# Patient Record
Sex: FEMALE | Race: WHITE | NOT HISPANIC OR LATINO | Employment: FULL TIME | URBAN - METROPOLITAN AREA
[De-identification: names, ages, dates, MRNs, and addresses within clinical notes are randomized per-mention and may not be internally consistent; named-entity substitution may affect disease eponyms.]

---

## 2018-10-23 ENCOUNTER — HOSPITAL ENCOUNTER (EMERGENCY)
Facility: HOSPITAL | Age: 28
Discharge: HOME | End: 2018-10-23
Attending: EMERGENCY MEDICINE | Admitting: EMERGENCY MEDICINE

## 2018-10-23 VITALS
TEMPERATURE: 97.9 F | BODY MASS INDEX: 29.98 KG/M2 | RESPIRATION RATE: 19 BRPM | HEART RATE: 71 BPM | DIASTOLIC BLOOD PRESSURE: 76 MMHG | SYSTOLIC BLOOD PRESSURE: 121 MMHG | HEIGHT: 67 IN | OXYGEN SATURATION: 94 % | WEIGHT: 191 LBS

## 2018-10-23 DIAGNOSIS — M06.9 RHEUMATOID ARTHRITIS FLARE (HCC): Primary | ICD-10-CM

## 2018-10-23 LAB
ALBUMIN SERPL-MCNC: 4.05 G/DL (ref 3.2–4.8)
ALBUMIN/GLOB SERPL: 1.6 G/DL (ref 1.5–2.5)
ALP SERPL-CCNC: 49 U/L (ref 25–100)
ALT SERPL W P-5'-P-CCNC: 34 U/L (ref 7–40)
ANION GAP SERPL CALCULATED.3IONS-SCNC: 4 MMOL/L (ref 3–11)
AST SERPL-CCNC: 34 U/L (ref 0–33)
BASOPHILS # BLD AUTO: 0.04 10*3/MM3 (ref 0–0.2)
BASOPHILS NFR BLD AUTO: 0.5 % (ref 0–1)
BILIRUB SERPL-MCNC: 0.4 MG/DL (ref 0.3–1.2)
BUN BLD-MCNC: 10 MG/DL (ref 9–23)
BUN/CREAT SERPL: 11.2 (ref 7–25)
CALCIUM SPEC-SCNC: 8.8 MG/DL (ref 8.7–10.4)
CHLORIDE SERPL-SCNC: 109 MMOL/L (ref 99–109)
CO2 SERPL-SCNC: 25 MMOL/L (ref 20–31)
CREAT BLD-MCNC: 0.89 MG/DL (ref 0.6–1.3)
CRP SERPL-MCNC: 1.61 MG/DL (ref 0–1)
DEPRECATED RDW RBC AUTO: 44.8 FL (ref 37–54)
EOSINOPHIL # BLD AUTO: 0.08 10*3/MM3 (ref 0–0.3)
EOSINOPHIL NFR BLD AUTO: 0.9 % (ref 0–3)
ERYTHROCYTE [DISTWIDTH] IN BLOOD BY AUTOMATED COUNT: 12.9 % (ref 11.3–14.5)
ERYTHROCYTE [SEDIMENTATION RATE] IN BLOOD: 15 MM/HR (ref 0–20)
GFR SERPL CREATININE-BSD FRML MDRD: 76 ML/MIN/1.73
GLOBULIN UR ELPH-MCNC: 2.6 GM/DL
GLUCOSE BLD-MCNC: 93 MG/DL (ref 70–100)
HCT VFR BLD AUTO: 39.3 % (ref 34.5–44)
HGB BLD-MCNC: 13 G/DL (ref 11.5–15.5)
IMM GRANULOCYTES # BLD: 0.01 10*3/MM3 (ref 0–0.03)
IMM GRANULOCYTES NFR BLD: 0.1 % (ref 0–0.6)
LYMPHOCYTES # BLD AUTO: 1.19 10*3/MM3 (ref 0.6–4.8)
LYMPHOCYTES NFR BLD AUTO: 13.8 % (ref 24–44)
MCH RBC QN AUTO: 31.2 PG (ref 27–31)
MCHC RBC AUTO-ENTMCNC: 33.1 G/DL (ref 32–36)
MCV RBC AUTO: 94.2 FL (ref 80–99)
MONOCYTES # BLD AUTO: 0.68 10*3/MM3 (ref 0–1)
MONOCYTES NFR BLD AUTO: 7.9 % (ref 0–12)
NEUTROPHILS # BLD AUTO: 6.66 10*3/MM3 (ref 1.5–8.3)
NEUTROPHILS NFR BLD AUTO: 76.9 % (ref 41–71)
NRBC BLD MANUAL-RTO: 0 /100 WBC (ref 0–0)
PLATELET # BLD AUTO: 180 10*3/MM3 (ref 150–450)
PMV BLD AUTO: 11.8 FL (ref 6–12)
POTASSIUM BLD-SCNC: 3.9 MMOL/L (ref 3.5–5.5)
PROT SERPL-MCNC: 6.6 G/DL (ref 5.7–8.2)
RBC # BLD AUTO: 4.17 10*6/MM3 (ref 3.89–5.14)
SODIUM BLD-SCNC: 138 MMOL/L (ref 132–146)
WBC NRBC COR # BLD: 8.65 10*3/MM3 (ref 3.5–10.8)

## 2018-10-23 PROCEDURE — 25010000002 KETOROLAC TROMETHAMINE PER 15 MG: Performed by: EMERGENCY MEDICINE

## 2018-10-23 PROCEDURE — 25010000002 HYDROMORPHONE 1 MG/ML SOLUTION: Performed by: EMERGENCY MEDICINE

## 2018-10-23 PROCEDURE — 25010000002 METHYLPREDNISOLONE PER 40 MG: Performed by: EMERGENCY MEDICINE

## 2018-10-23 PROCEDURE — 96374 THER/PROPH/DIAG INJ IV PUSH: CPT

## 2018-10-23 PROCEDURE — 85652 RBC SED RATE AUTOMATED: CPT | Performed by: EMERGENCY MEDICINE

## 2018-10-23 PROCEDURE — 96375 TX/PRO/DX INJ NEW DRUG ADDON: CPT

## 2018-10-23 PROCEDURE — 80053 COMPREHEN METABOLIC PANEL: CPT | Performed by: EMERGENCY MEDICINE

## 2018-10-23 PROCEDURE — 99284 EMERGENCY DEPT VISIT MOD MDM: CPT

## 2018-10-23 PROCEDURE — 86140 C-REACTIVE PROTEIN: CPT | Performed by: EMERGENCY MEDICINE

## 2018-10-23 PROCEDURE — 85025 COMPLETE CBC W/AUTO DIFF WBC: CPT | Performed by: EMERGENCY MEDICINE

## 2018-10-23 RX ORDER — LEVETIRACETAM 750 MG/1
750 TABLET ORAL 2 TIMES DAILY
COMMUNITY
End: 2020-07-23

## 2018-10-23 RX ORDER — SODIUM CHLORIDE 0.9 % (FLUSH) 0.9 %
10 SYRINGE (ML) INJECTION AS NEEDED
Status: DISCONTINUED | OUTPATIENT
Start: 2018-10-23 | End: 2018-10-23 | Stop reason: HOSPADM

## 2018-10-23 RX ORDER — BUPROPION HYDROCHLORIDE 150 MG/1
150 TABLET ORAL DAILY
COMMUNITY
End: 2020-07-23

## 2018-10-23 RX ORDER — KETOROLAC TROMETHAMINE 15 MG/ML
10 INJECTION, SOLUTION INTRAMUSCULAR; INTRAVENOUS ONCE
Status: COMPLETED | OUTPATIENT
Start: 2018-10-23 | End: 2018-10-23

## 2018-10-23 RX ORDER — METHYLPREDNISOLONE SODIUM SUCCINATE 40 MG/ML
120 INJECTION, POWDER, LYOPHILIZED, FOR SOLUTION INTRAMUSCULAR; INTRAVENOUS ONCE
Status: COMPLETED | OUTPATIENT
Start: 2018-10-23 | End: 2018-10-23

## 2018-10-23 RX ORDER — OXYCODONE HYDROCHLORIDE AND ACETAMINOPHEN 5; 325 MG/1; MG/1
1 TABLET ORAL EVERY 6 HOURS PRN
Qty: 12 TABLET | Refills: 0 | OUTPATIENT
Start: 2018-10-23 | End: 2020-07-23

## 2018-10-23 RX ORDER — PREDNISONE 10 MG/1
TABLET ORAL
Qty: 60 TABLET | Refills: 0 | OUTPATIENT
Start: 2018-10-23 | End: 2022-07-31

## 2018-10-23 RX ADMIN — KETOROLAC TROMETHAMINE 10 MG: 15 INJECTION, SOLUTION INTRAMUSCULAR; INTRAVENOUS at 10:28

## 2018-10-23 RX ADMIN — HYDROMORPHONE HYDROCHLORIDE 1 MG: 1 INJECTION, SOLUTION INTRAMUSCULAR; INTRAVENOUS; SUBCUTANEOUS at 11:11

## 2018-10-23 RX ADMIN — METHYLPREDNISOLONE SODIUM SUCCINATE 120 MG: 40 INJECTION, POWDER, FOR SOLUTION INTRAMUSCULAR; INTRAVENOUS at 10:29

## 2018-10-23 NOTE — ED PROVIDER NOTES
Subjective   Nicanor Butler is a 28 y.o.female with a hx of rheumatoid arthritis who presents to the ED with complaints of arm pain. The patient complains of arthralgias throughout her entire left arm secondary to a rheumatoid arthritis flare up. She says her pain is the worst in her left shoulder and worsened by movement. She also endorses pain to a lesser extent in her right wrist and bilateral knees. She has not taken any medications for her pain. She used CBD oil yesterday. She was very stressed yesterday and states that stress often causes flare ups of her pain. She also has mild chest tightness and complains of eye pain and early onset of a headache. She denies any shortness of breath, fever, shakes, or chills. She used to take Methotrexate and Humira for her rheumatoid arthritis, but she ultimately discontinued these medications approximately 2 months ago because she believed the risks outweighed the benefits. She says that she was doing well on these medications. She has not been able to establish a relationship with a Rheumatologist since moving to [place] from [place]. She used prednisone a year ago for unspecified arthritis pain until she received a diagnosis of rheumatoid arthritis, at which time she was transitioned to the meds above. Her past medical history is significant for migraines, fainting spells, and vasovagal syncope. There are no other acute complaints at this time.             History provided by:  Patient  Pain   Location:  Left arm, right wrist, knees  Quality:  Arthritis  Severity:  Severe  Onset quality:  Gradual  Timing:  Constant  Progression:  Unchanged  Chronicity:  Recurrent  Context:  Hx of rheumatoid arthritis. Recent flare up. Very stressed yesterday.   Relieved by:  Nothing  Worsened by:  Movement.   Ineffective treatments:  CBD oil.   Associated symptoms: headaches    Associated symptoms: no fever and no shortness of breath        Review of Systems   Constitutional: Negative for  chills and fever.   Eyes: Positive for pain.   Respiratory: Positive for chest tightness (mild). Negative for shortness of breath.    Musculoskeletal: Positive for arthralgias.   Neurological: Positive for headaches.   All other systems reviewed and are negative.      Past Medical History:   Diagnosis Date   • Depression    • Seizures (CMS/HCC)        Allergies   Allergen Reactions   • Motrin [Ibuprofen] Swelling     ONLY BRAND NAME MOTRIN       History reviewed. No pertinent surgical history.    History reviewed. No pertinent family history.    Social History     Social History   • Marital status:      Social History Main Topics   • Smoking status: Never Smoker   • Alcohol use Yes   • Drug use: No   • Sexual activity: Defer     Other Topics Concern   • Not on file         Objective   Physical Exam   Constitutional: She is oriented to person, place, and time. She appears well-developed and well-nourished. She appears distressed.   HENT:   Head: Normocephalic and atraumatic.   Nose: Nose normal.   Eyes: Conjunctivae are normal. No scleral icterus.   Neck: Normal range of motion. Neck supple.   She is tender all across the back of neck and trapezii.    Cardiovascular: Normal rate, regular rhythm and normal heart sounds.    No murmur heard.  Pulmonary/Chest: Effort normal and breath sounds normal. No respiratory distress.   Abdominal: Soft. Bowel sounds are normal. There is no tenderness.   Musculoskeletal: She exhibits tenderness. She exhibits no edema.   She has no T or L spine tenderness. Her right wrist is painful with motion and without any obvious redness or swelling. She has some pain in her right hip with flexion. There is no redness or heat in her left upper extremity, but she exhibits a lot of pain with movement of all joints in her left upper extremity.   Neurological: She is alert and oriented to person, place, and time.   Skin: Skin is warm and dry.   Psychiatric: She has a normal mood and affect.  Her behavior is normal.   Nursing note and vitals reviewed.      Procedures         ED Course  ED Course as of Oct 23 1540   Tue Oct 23, 2018   0956 She states that she can take ibuprofen, just not Motrin.  Has not had other NSAIDs.  [LI]   1052 Revisited the patient to update her on the results of labs and the plan for further care.   [TJ]   1054 She is only feeling a little better but will hopefully steadily improve.  She needs a PCP to be referred to a rheumatologist.  [LI]   1057 CHAPINCITO query complete. Treatment plan to include limited course of prescribed  controlled substance. Risks including addiction, benefits, and alternatives presented to patient.     [TJ]      ED Course User Index  [LI] Salvador Villarreal MD  [TJ] Chandler Dejesus       Recent Results (from the past 24 hour(s))   Comprehensive Metabolic Panel    Collection Time: 10/23/18 10:11 AM   Result Value Ref Range    Glucose 93 70 - 100 mg/dL    BUN 10 9 - 23 mg/dL    Creatinine 0.89 0.60 - 1.30 mg/dL    Sodium 138 132 - 146 mmol/L    Potassium 3.9 3.5 - 5.5 mmol/L    Chloride 109 99 - 109 mmol/L    CO2 25.0 20.0 - 31.0 mmol/L    Calcium 8.8 8.7 - 10.4 mg/dL    Total Protein 6.6 5.7 - 8.2 g/dL    Albumin 4.05 3.20 - 4.80 g/dL    ALT (SGPT) 34 7 - 40 U/L    AST (SGOT) 34 (H) 0 - 33 U/L    Alkaline Phosphatase 49 25 - 100 U/L    Total Bilirubin 0.4 0.3 - 1.2 mg/dL    eGFR Non African Amer 76 >60 mL/min/1.73    Globulin 2.6 gm/dL    A/G Ratio 1.6 1.5 - 2.5 g/dL    BUN/Creatinine Ratio 11.2 7.0 - 25.0    Anion Gap 4.0 3.0 - 11.0 mmol/L   Sedimentation Rate    Collection Time: 10/23/18 10:11 AM   Result Value Ref Range    Sed Rate 15 0 - 20 mm/hr   C-reactive Protein    Collection Time: 10/23/18 10:11 AM   Result Value Ref Range    C-Reactive Protein 1.61 (H) 0.00 - 1.00 mg/dL   CBC Auto Differential    Collection Time: 10/23/18 10:11 AM   Result Value Ref Range    WBC 8.65 3.50 - 10.80 10*3/mm3    RBC 4.17 3.89 - 5.14 10*6/mm3    Hemoglobin 13.0 11.5 -  "15.5 g/dL    Hematocrit 39.3 34.5 - 44.0 %    MCV 94.2 80.0 - 99.0 fL    MCH 31.2 (H) 27.0 - 31.0 pg    MCHC 33.1 32.0 - 36.0 g/dL    RDW 12.9 11.3 - 14.5 %    RDW-SD 44.8 37.0 - 54.0 fl    MPV 11.8 6.0 - 12.0 fL    Platelets 180 150 - 450 10*3/mm3    Neutrophil % 76.9 (H) 41.0 - 71.0 %    Lymphocyte % 13.8 (L) 24.0 - 44.0 %    Monocyte % 7.9 0.0 - 12.0 %    Eosinophil % 0.9 0.0 - 3.0 %    Basophil % 0.5 0.0 - 1.0 %    Immature Grans % 0.1 0.0 - 0.6 %    Neutrophils, Absolute 6.66 1.50 - 8.30 10*3/mm3    Lymphocytes, Absolute 1.19 0.60 - 4.80 10*3/mm3    Monocytes, Absolute 0.68 0.00 - 1.00 10*3/mm3    Eosinophils, Absolute 0.08 0.00 - 0.30 10*3/mm3    Basophils, Absolute 0.04 0.00 - 0.20 10*3/mm3    Immature Grans, Absolute 0.01 0.00 - 0.03 10*3/mm3    nRBC 0.0 0.0 - 0.0 /100 WBC     Note: In addition to lab results from this visit, the labs listed above may include labs taken at another facility or during a different encounter within the last 24 hours. Please correlate lab times with ED admission and discharge times for further clarification of the services performed during this visit.    No orders to display     Vitals:    10/23/18 0916 10/23/18 1024 10/23/18 1125 10/23/18 1130   BP: 141/84 134/81 121/76    BP Location: Right arm      Patient Position: Sitting      Pulse: 81 72  71   Resp: 18   19   Temp: 97.9 °F (36.6 °C)      TempSrc: Oral      SpO2: 96% 99% 94%    Weight: 86.6 kg (191 lb)      Height: 170.2 cm (67\")        Medications   ketorolac (TORADOL) injection 10 mg (10 mg Intravenous Given 10/23/18 1028)   methylPREDNISolone sodium succinate (SOLU-Medrol) injection 120 mg (120 mg Intravenous Given 10/23/18 1029)   HYDROmorphone (DILAUDID) injection 1 mg (1 mg Intravenous Given 10/23/18 1111)     ECG/EMG Results (last 24 hours)     ** No results found for the last 24 hours. **                      [facility]    Final diagnoses:   Rheumatoid arthritis flare (CMS/[facility])       Documentation assistance provided by " chapo Dejesus.  Information recorded by the scribe was done at my direction and has been verified and validated by me.     Chandler Dejesus  10/23/18 1010       Chandler Dejesus  10/23/18 1100       Salvador Villarreal MD  10/23/18 9091

## 2018-10-23 NOTE — DISCHARGE INSTRUCTIONS
Start the prednisone tomorrow morning.     Follow up with one of the [facility] Primary Care Providers below as soon as possible to setup primary care. If you need assistance coordinating a primary care appointment with a [facility] Primary Care Provider, please contact the Primary Care Coordinators at [phone] for appointment scheduling.    [facility], Primary Care   [address] , [address]   [place], [place] [address] [phone]    [facility] Internal Medicine & Endocrinology  [address], [address]  [place] [address] [phone]    [facility] Family Medicine  [address], [address]   [place] [address] [phone]    [facility] Primary Care  [address], [address]  [place], [place] [id number] [phone]    [facility], Primary Care,   [address], [address]   [place] [address] [phone]    [facility] Primary Care  [address]  [place], [place] [id number] [phone]    [facility], Primary Care  [address], [address]  [place] [address] [phone]    [facility] Internal Medicine & Pediatrics  [address], [address]   [place] [address] [phone]    [facility], Primary Care  [address] [place] [address] [phone]      [facility] Primary Care  [address], [address]   [place] [address] [phone]    [facility] Family Medicine  [address] [place] [address] [place] [address] [phone]      Immediately return to the ED for worsening or new concerning symptoms.

## 2018-11-27 ENCOUNTER — HOSPITAL ENCOUNTER (EMERGENCY)
Facility: HOSPITAL | Age: 28
Discharge: HOME | End: 2018-11-27
Attending: EMERGENCY MEDICINE | Admitting: EMERGENCY MEDICINE

## 2018-11-27 VITALS
SYSTOLIC BLOOD PRESSURE: 161 MMHG | TEMPERATURE: 98.1 F | RESPIRATION RATE: 16 BRPM | DIASTOLIC BLOOD PRESSURE: 70 MMHG | HEART RATE: 87 BPM | OXYGEN SATURATION: 99 % | WEIGHT: 195 LBS | BODY MASS INDEX: 30.61 KG/M2 | HEIGHT: 67 IN

## 2018-11-27 DIAGNOSIS — N39.0 ACUTE UTI: Primary | ICD-10-CM

## 2018-11-27 LAB
ALBUMIN SERPL-MCNC: 4.46 G/DL (ref 3.2–4.8)
ALBUMIN/GLOB SERPL: 1.6 G/DL (ref 1.5–2.5)
ALP SERPL-CCNC: 57 U/L (ref 25–100)
ALT SERPL W P-5'-P-CCNC: 28 U/L (ref 7–40)
ANION GAP SERPL CALCULATED.3IONS-SCNC: 7 MMOL/L (ref 3–11)
AST SERPL-CCNC: 28 U/L (ref 0–33)
B-HCG UR QL: NEGATIVE
BACTERIA UR QL AUTO: ABNORMAL /HPF
BASOPHILS # BLD AUTO: 0.03 10*3/MM3 (ref 0–0.2)
BASOPHILS NFR BLD AUTO: 0.3 % (ref 0–1)
BILIRUB SERPL-MCNC: 0.6 MG/DL (ref 0.3–1.2)
BILIRUB UR QL STRIP: NEGATIVE
BUN BLD-MCNC: 9 MG/DL (ref 9–23)
BUN/CREAT SERPL: 10.5 (ref 7–25)
CALCIUM SPEC-SCNC: 9.2 MG/DL (ref 8.7–10.4)
CHLORIDE SERPL-SCNC: 102 MMOL/L (ref 99–109)
CLARITY UR: ABNORMAL
CO2 SERPL-SCNC: 29 MMOL/L (ref 20–31)
COLOR UR: YELLOW
CREAT BLD-MCNC: 0.86 MG/DL (ref 0.6–1.3)
DEPRECATED RDW RBC AUTO: 46.1 FL (ref 37–54)
EOSINOPHIL # BLD AUTO: 0.03 10*3/MM3 (ref 0–0.3)
EOSINOPHIL NFR BLD AUTO: 0.3 % (ref 0–3)
ERYTHROCYTE [DISTWIDTH] IN BLOOD BY AUTOMATED COUNT: 13.1 % (ref 11.3–14.5)
GFR SERPL CREATININE-BSD FRML MDRD: 79 ML/MIN/1.73
GLOBULIN UR ELPH-MCNC: 2.7 GM/DL
GLUCOSE BLD-MCNC: 80 MG/DL (ref 70–100)
GLUCOSE UR STRIP-MCNC: NEGATIVE MG/DL
HCT VFR BLD AUTO: 43 % (ref 34.5–44)
HGB BLD-MCNC: 14 G/DL (ref 11.5–15.5)
HGB UR QL STRIP.AUTO: ABNORMAL
HOLD SPECIMEN: NORMAL
HOLD SPECIMEN: NORMAL
HYALINE CASTS UR QL AUTO: ABNORMAL /LPF
IMM GRANULOCYTES # BLD: 0.02 10*3/MM3 (ref 0–0.03)
IMM GRANULOCYTES NFR BLD: 0.2 % (ref 0–0.6)
INTERNAL NEGATIVE CONTROL: NEGATIVE
INTERNAL POSITIVE CONTROL: POSITIVE
KETONES UR QL STRIP: NEGATIVE
LEUKOCYTE ESTERASE UR QL STRIP.AUTO: ABNORMAL
LIPASE SERPL-CCNC: 27 U/L (ref 6–51)
LYMPHOCYTES # BLD AUTO: 2.09 10*3/MM3 (ref 0.6–4.8)
LYMPHOCYTES NFR BLD AUTO: 17.5 % (ref 24–44)
Lab: NORMAL
MCH RBC QN AUTO: 31.2 PG (ref 27–31)
MCHC RBC AUTO-ENTMCNC: 32.6 G/DL (ref 32–36)
MCV RBC AUTO: 95.8 FL (ref 80–99)
MONOCYTES # BLD AUTO: 0.7 10*3/MM3 (ref 0–1)
MONOCYTES NFR BLD AUTO: 5.9 % (ref 0–12)
NEUTROPHILS # BLD AUTO: 9.07 10*3/MM3 (ref 1.5–8.3)
NEUTROPHILS NFR BLD AUTO: 75.8 % (ref 41–71)
NITRITE UR QL STRIP: NEGATIVE
PH UR STRIP.AUTO: 5.5 [PH] (ref 5–8)
PLATELET # BLD AUTO: 209 10*3/MM3 (ref 150–450)
PMV BLD AUTO: 10.9 FL (ref 6–12)
POTASSIUM BLD-SCNC: 3.7 MMOL/L (ref 3.5–5.5)
PROT SERPL-MCNC: 7.2 G/DL (ref 5.7–8.2)
PROT UR QL STRIP: NEGATIVE
RBC # BLD AUTO: 4.49 10*6/MM3 (ref 3.89–5.14)
RBC # UR: ABNORMAL /HPF
REF LAB TEST METHOD: ABNORMAL
SODIUM BLD-SCNC: 138 MMOL/L (ref 132–146)
SP GR UR STRIP: 1.01 (ref 1–1.03)
SQUAMOUS #/AREA URNS HPF: ABNORMAL /HPF
UROBILINOGEN UR QL STRIP: ABNORMAL
WBC NRBC COR # BLD: 11.94 10*3/MM3 (ref 3.5–10.8)
WBC UR QL AUTO: ABNORMAL /HPF
WHOLE BLOOD HOLD SPECIMEN: NORMAL
WHOLE BLOOD HOLD SPECIMEN: NORMAL

## 2018-11-27 PROCEDURE — 99283 EMERGENCY DEPT VISIT LOW MDM: CPT

## 2018-11-27 PROCEDURE — 96372 THER/PROPH/DIAG INJ SC/IM: CPT

## 2018-11-27 PROCEDURE — 25010000002 CEFTRIAXONE PER 250 MG: Performed by: PHYSICIAN ASSISTANT

## 2018-11-27 PROCEDURE — 85025 COMPLETE CBC W/AUTO DIFF WBC: CPT | Performed by: EMERGENCY MEDICINE

## 2018-11-27 PROCEDURE — 80053 COMPREHEN METABOLIC PANEL: CPT | Performed by: EMERGENCY MEDICINE

## 2018-11-27 PROCEDURE — 81025 URINE PREGNANCY TEST: CPT | Performed by: EMERGENCY MEDICINE

## 2018-11-27 PROCEDURE — 81001 URINALYSIS AUTO W/SCOPE: CPT | Performed by: EMERGENCY MEDICINE

## 2018-11-27 PROCEDURE — 83690 ASSAY OF LIPASE: CPT | Performed by: EMERGENCY MEDICINE

## 2018-11-27 RX ORDER — PHENAZOPYRIDINE HYDROCHLORIDE 200 MG/1
200 TABLET, FILM COATED ORAL 3 TIMES DAILY PRN
Qty: 6 TABLET | Refills: 0 | OUTPATIENT
Start: 2018-11-27 | End: 2020-07-23

## 2018-11-27 RX ORDER — PHENAZOPYRIDINE HYDROCHLORIDE 100 MG/1
200 TABLET, FILM COATED ORAL ONCE
Status: COMPLETED | OUTPATIENT
Start: 2018-11-27 | End: 2018-11-27

## 2018-11-27 RX ORDER — SODIUM CHLORIDE 0.9 % (FLUSH) 0.9 %
10 SYRINGE (ML) INJECTION AS NEEDED
Status: DISCONTINUED | OUTPATIENT
Start: 2018-11-27 | End: 2018-11-27 | Stop reason: HOSPADM

## 2018-11-27 RX ORDER — CEFDINIR 300 MG/1
300 CAPSULE ORAL 2 TIMES DAILY
Qty: 20 CAPSULE | Refills: 0 | OUTPATIENT
Start: 2018-11-27 | End: 2019-08-17

## 2018-11-27 RX ADMIN — PHENAZOPYRIDINE HYDROCHLORIDE 200 MG: 100 TABLET ORAL at 20:59

## 2018-11-27 RX ADMIN — LIDOCAINE HYDROCHLORIDE 1 G: 10 INJECTION, SOLUTION EPIDURAL; INFILTRATION; INTRACAUDAL; PERINEURAL at 21:00

## 2019-01-04 ENCOUNTER — HOSPITAL ENCOUNTER (EMERGENCY)
Facility: HOSPITAL | Age: 29
Discharge: HOME | End: 2019-01-04
Attending: EMERGENCY MEDICINE | Admitting: EMERGENCY MEDICINE

## 2019-01-04 ENCOUNTER — APPOINTMENT (OUTPATIENT)
Dept: GENERAL RADIOLOGY | Facility: HOSPITAL | Age: 29
End: 2019-01-04

## 2019-01-04 VITALS
BODY MASS INDEX: 34.6 KG/M2 | WEIGHT: 188 LBS | HEIGHT: 62 IN | HEART RATE: 91 BPM | SYSTOLIC BLOOD PRESSURE: 135 MMHG | RESPIRATION RATE: 18 BRPM | TEMPERATURE: 98.5 F | OXYGEN SATURATION: 99 % | DIASTOLIC BLOOD PRESSURE: 79 MMHG

## 2019-01-04 DIAGNOSIS — M19.90 ACUTE ARTHRITIS: Primary | ICD-10-CM

## 2019-01-04 LAB — S PYO AG THROAT QL: NEGATIVE

## 2019-01-04 PROCEDURE — 63710000001 PREDNISONE PER 1 MG: Performed by: EMERGENCY MEDICINE

## 2019-01-04 PROCEDURE — 71045 X-RAY EXAM CHEST 1 VIEW: CPT

## 2019-01-04 PROCEDURE — 99284 EMERGENCY DEPT VISIT MOD MDM: CPT

## 2019-01-04 PROCEDURE — 96374 THER/PROPH/DIAG INJ IV PUSH: CPT

## 2019-01-04 PROCEDURE — 87880 STREP A ASSAY W/OPTIC: CPT | Performed by: EMERGENCY MEDICINE

## 2019-01-04 PROCEDURE — 25010000002 FENTANYL CITRATE (PF) 100 MCG/2ML SOLUTION: Performed by: EMERGENCY MEDICINE

## 2019-01-04 PROCEDURE — 87081 CULTURE SCREEN ONLY: CPT | Performed by: EMERGENCY MEDICINE

## 2019-01-04 RX ORDER — PREDNISONE 20 MG/1
60 TABLET ORAL ONCE
Status: COMPLETED | OUTPATIENT
Start: 2019-01-04 | End: 2019-01-04

## 2019-01-04 RX ORDER — FENTANYL CITRATE 50 UG/ML
50 INJECTION, SOLUTION INTRAMUSCULAR; INTRAVENOUS ONCE
Status: COMPLETED | OUTPATIENT
Start: 2019-01-04 | End: 2019-01-04

## 2019-01-04 RX ORDER — HYDROCODONE BITARTRATE AND ACETAMINOPHEN 5; 325 MG/1; MG/1
1-2 TABLET ORAL EVERY 6 HOURS PRN
Qty: 8 TABLET | Refills: 0 | OUTPATIENT
Start: 2019-01-04 | End: 2020-07-23

## 2019-01-04 RX ORDER — PREDNISONE 50 MG/1
50 TABLET ORAL DAILY
Qty: 5 TABLET | Refills: 0 | Status: SHIPPED | OUTPATIENT
Start: 2019-01-04 | End: 2019-01-09

## 2019-01-04 RX ADMIN — PREDNISONE 60 MG: 20 TABLET ORAL at 04:31

## 2019-01-04 RX ADMIN — FENTANYL CITRATE 50 MCG: 50 INJECTION, SOLUTION INTRAMUSCULAR; INTRAVENOUS at 04:40

## 2019-01-04 NOTE — ED PROVIDER NOTES
Subjective   Ms. Nicanor Butler is a 28 y.o. female who presents to the ED with c/o pain. She has a history of rheumatoid arthritis. She reports for the past 2 weeks she has been experiencing pain in her right arm, bilateral hands, her back, and her left leg, which is where she has experienced pain with prior episodes of rheumatoid arthritis. She reports today her pain is significantly worse. She recently moved here from [place] and she has not established care with a rheumatologist here. She is not currently taking any medications for rheumatoid arthritis. She reports her known triggers for RA are certain foods, alcohol, and strenuous exercise. She reports steroids have alleviated her pain in the past. She also has a history of vasovagal syncope and denies history of DM. There are no other acute symptoms at this time.        History provided by:  Patient  Pain   Location:  Right arm, bilateral hands, back, left leg  Severity:  Severe  Onset quality:  Gradual  Duration:  2 weeks  Timing:  Constant  Progression:  Worsening  Chronicity:  Recurrent  Context:  History of rheumatoid arthritis  Relieved by:  None tried  Worsened by:  Nothing  Ineffective treatments:  None tried      Review of Systems   All other systems reviewed and are negative.      Past Medical History:   Diagnosis Date   • Depression    • Rheumatoid arthritis (CMS/HCC)    • Seizures (CMS/HCC)        Allergies   Allergen Reactions   • Motrin [Ibuprofen] Swelling     ONLY BRAND NAME MOTRIN       Past Surgical History:   Procedure Laterality Date   • MANDIBLE SURGERY         No family history on file.    Social History     Socioeconomic History   • Marital status:      Spouse name: Not on file   • Number of children: Not on file   • Years of education: Not on file   • Highest education level: Not on file   Tobacco Use   • Smoking status: Never Smoker   • Smokeless tobacco: Never Used   Substance and Sexual Activity   • Alcohol use: Yes     Comment:  2-3X WEEK    • Drug use: No   • Sexual activity: Yes         Objective   Physical Exam   Constitutional: She is oriented to person, place, and time. She appears well-developed and well-nourished. No distress.   HENT:   Head: Normocephalic and atraumatic.   Nose: Nose normal.   Mild posterior pharyngeal erythema   Eyes: Conjunctivae are normal. No scleral icterus.   Neck: Normal range of motion. Neck supple.   Cardiovascular: Normal rate, regular rhythm and normal heart sounds.   No murmur heard.  Pulmonary/Chest: Effort normal and breath sounds normal. No respiratory distress.   Abdominal: Soft. Bowel sounds are normal. There is no tenderness.   Musculoskeletal: Normal range of motion. She exhibits no edema.   Diffuse tenderness to palpation. No erythema or swelling.   Neurological: She is alert and oriented to person, place, and time.   Skin: Skin is warm and dry.   Psychiatric: She has a normal mood and affect. Her behavior is normal.   Nursing note and vitals reviewed.      Procedures         ED Course     No results found for this or any previous visit (from the past 24 hour(s)).  Note: In addition to lab results from this visit, the labs listed above may include labs taken at another facility or during a different encounter within the last 24 hours. Please correlate lab times with ED admission and discharge times for further clarification of the services performed during this visit.    XR Chest 1 View   Final Result   No acute cardiopulmonary disease.       D:  01/04/2019   E:  01/04/2019       This report was finalized on 1/4/2019 2:21 PM by Dr. Sully Lyle MD.            Vitals:    01/04/19 0154 01/04/19 0200 01/04/19 0230 01/04/19 0330   BP: 118/77 117/70 116/75 121/76   BP Location:       Patient Position:       Pulse:       Resp:       Temp:       TempSrc:       SpO2:  96% 97% 97%   Weight:       Height:         Medications   predniSONE (DELTASONE) tablet 60 mg (not administered)   fentaNYL  citrate (PF) (SUBLIMAZE) injection 50 mcg (not administered)     ECG/EMG Results (last 24 hours)     ** No results found for the last 24 hours. **      Results for DESHAWN WASSERMAN (MRN [id number]) as of 1/8/2019 13:49   Ref. Range 1/4/2019 04:40   Strep A Ag Latest Ref Range: Negative  Negative                     MDM    Final diagnoses:   Acute arthritis       Documentation assistance provided by chapo Mehta.  Information recorded by the scribe was done at my direction and has been verified and validated by me.     Gerardo Mehta  01/04/19 0322       Gerardo Mehta  01/04/19 0403       Baljeet Cabral DO  01/08/19 7659

## 2019-01-06 LAB — BACTERIA SPEC AEROBE CULT: NORMAL

## 2019-08-17 ENCOUNTER — APPOINTMENT (OUTPATIENT)
Dept: GENERAL RADIOLOGY | Facility: HOSPITAL | Age: 29
End: 2019-08-17

## 2019-08-17 ENCOUNTER — HOSPITAL ENCOUNTER (EMERGENCY)
Facility: HOSPITAL | Age: 29
Discharge: HOME | End: 2019-08-17
Attending: EMERGENCY MEDICINE | Admitting: EMERGENCY MEDICINE

## 2019-08-17 VITALS
HEART RATE: 86 BPM | OXYGEN SATURATION: 98 % | RESPIRATION RATE: 18 BRPM | WEIGHT: 198 LBS | SYSTOLIC BLOOD PRESSURE: 103 MMHG | HEIGHT: 66 IN | TEMPERATURE: 98.4 F | DIASTOLIC BLOOD PRESSURE: 73 MMHG | BODY MASS INDEX: 31.82 KG/M2

## 2019-08-17 DIAGNOSIS — M25.552 ACUTE HIP PAIN, LEFT: Primary | ICD-10-CM

## 2019-08-17 DIAGNOSIS — Z87.39 HISTORY OF RHEUMATOID ARTHRITIS: ICD-10-CM

## 2019-08-17 PROCEDURE — 99284 EMERGENCY DEPT VISIT MOD MDM: CPT

## 2019-08-17 PROCEDURE — 25010000002 TRIAMCINOLONE PER 10 MG: Performed by: NURSE PRACTITIONER

## 2019-08-17 PROCEDURE — 96372 THER/PROPH/DIAG INJ SC/IM: CPT

## 2019-08-17 PROCEDURE — 73502 X-RAY EXAM HIP UNI 2-3 VIEWS: CPT

## 2019-08-17 PROCEDURE — 25010000002 KETOROLAC TROMETHAMINE PER 15 MG: Performed by: NURSE PRACTITIONER

## 2019-08-17 RX ORDER — METHYLPREDNISOLONE 4 MG/1
TABLET ORAL
Qty: 21 TABLET | Refills: 0 | OUTPATIENT
Start: 2019-08-17 | End: 2020-07-23

## 2019-08-17 RX ORDER — HYDROCODONE BITARTRATE AND ACETAMINOPHEN 5; 325 MG/1; MG/1
1 TABLET ORAL EVERY 8 HOURS PRN
Qty: 8 TABLET | Refills: 0 | OUTPATIENT
Start: 2019-08-17 | End: 2020-07-23

## 2019-08-17 RX ORDER — HYDROCODONE BITARTRATE AND ACETAMINOPHEN 7.5; 325 MG/1; MG/1
1 TABLET ORAL ONCE
Status: COMPLETED | OUTPATIENT
Start: 2019-08-17 | End: 2019-08-17

## 2019-08-17 RX ORDER — ONDANSETRON 4 MG/1
4 TABLET, FILM COATED ORAL ONCE
Status: COMPLETED | OUTPATIENT
Start: 2019-08-17 | End: 2019-08-17

## 2019-08-17 RX ORDER — KETOROLAC TROMETHAMINE 30 MG/ML
60 INJECTION, SOLUTION INTRAMUSCULAR; INTRAVENOUS ONCE
Status: COMPLETED | OUTPATIENT
Start: 2019-08-17 | End: 2019-08-17

## 2019-08-17 RX ORDER — TRIAMCINOLONE ACETONIDE 40 MG/ML
80 INJECTION, SUSPENSION INTRA-ARTICULAR; INTRAMUSCULAR ONCE
Status: COMPLETED | OUTPATIENT
Start: 2019-08-17 | End: 2019-08-17

## 2019-08-17 RX ADMIN — HYDROCODONE BITARTRATE AND ACETAMINOPHEN 1 TABLET: 7.5; 325 TABLET ORAL at 17:06

## 2019-08-17 RX ADMIN — KETOROLAC TROMETHAMINE 60 MG: 30 INJECTION, SOLUTION INTRAMUSCULAR at 17:12

## 2019-08-17 RX ADMIN — ONDANSETRON HYDROCHLORIDE 4 MG: 4 TABLET, FILM COATED ORAL at 17:04

## 2019-08-17 RX ADMIN — TRIAMCINOLONE ACETONIDE 80 MG: 40 INJECTION, SUSPENSION INTRA-ARTICULAR; INTRAMUSCULAR at 17:12

## 2019-08-17 NOTE — ED PROVIDER NOTES
Subjective   Nicanor Butler is a 29 y.o. female who presents to the ED c/o hip pain. When she was 17 patient was in physical therapy for a different injury when she found out she had a dislocated hip since infancy. Since then she has had issues of her left hip slipping and pinching. She was also diagnosed with rheumatoid arthritis 2 years ago. This is her first instance of a rheumatoid arthritis flare in her hip. At 1900 last night her left hip and groin were sore. The pain is sharp and gradually worsening today. She took a percocet at 1400 then her  drove her to the ED. Last night the pain was 9/10 in severity but currently it is 7/10. She denies fever. She denies any falls recently or changes in appetite. She had a UTI 12/2018 which has caused mild urinary problems. She saw Dr. Mckinney, rheumatologist, a few weeks ago and there was nothing abnormal from her baseline. She has been exercising more recently. She does not have a history of fractures, but she bruised her tail bone once. She is currently on birth control. She does not have any recent memory of an x-ray of her hip. There are no other acute complaints at this time.            History provided by:  Patient  Hip Pain   Location:  Left hip, left groin  Severity:  Moderate  Onset quality:  Gradual  Duration:  21 hours  Progression:  Worsening  Chronicity:  Recurrent  Context:  Has had a dislocated hip since infancy. Has rheumatoid arthritis.   Worsened by:  Movement.  Associated symptoms: no fever        Review of Systems   Constitutional: Negative for fever.   Musculoskeletal:        Dislocated hip. Rheumatoid arthritis.   All other systems reviewed and are negative.      Past Medical History:   Diagnosis Date   • Depression    • Rheumatoid arthritis (CMS/HCC)    • Seizures (CMS/HCC)        Allergies   Allergen Reactions   • Motrin [Ibuprofen] Swelling     ONLY BRAND NAME MOTRIN       Past Surgical History:   Procedure Laterality Date   • MANDIBLE  SURGERY         No family history on file.    Social History     Socioeconomic History   • Marital status:      Spouse name: Not on file   • Number of children: Not on file   • Years of education: Not on file   • Highest education level: Not on file   Tobacco Use   • Smoking status: Never Smoker   • Smokeless tobacco: Never Used   Substance and Sexual Activity   • Alcohol use: Yes     Comment: 2-3X WEEK    • Drug use: No   • Sexual activity: Yes         Objective   Physical Exam   Constitutional: She is oriented to person, place, and time. She appears well-developed and well-nourished. No distress.   HENT:   Head: Normocephalic and atraumatic.   Eyes: Conjunctivae are normal. No scleral icterus.   Neck: Normal range of motion. Neck supple.   Cardiovascular: Normal rate, regular rhythm and normal heart sounds.   No murmur heard.  Pulmonary/Chest: Effort normal and breath sounds normal. No respiratory distress.   Abdominal: Soft. There is no tenderness.   Musculoskeletal: She exhibits tenderness. She exhibits no edema.   Acute discomfort of the left hip.  Limited range of motion secondary to pain.  Right lower extremity is unremarkable, atraumatic, and non-tender.   Neurological: She is alert and oriented to person, place, and time.   Distal vascular exam is negative.   Skin: Skin is warm and dry.   Psychiatric: She has a normal mood and affect. Her behavior is normal.   Nursing note and vitals reviewed.      Procedures         ED Course  ED Course as of Aug 17 2146   Sat Aug 17, 2019   1712 CHAPINCITO query complete. Treatment plan to include limited course of prescribed  controlled substance. Risks including addiction, benefits, and alternatives presented to patient.   CHAPINCITO request number: [id number]   [JW]   4883 Imelda Leonard is bedside re-evaluating the patient and updating them on the results of the studies.  [BS]      ED Course User Index  [BS] Manuel Marcano  [JW] Gerardo Mehta       No results found for this  or any previous visit (from the past 24 hour(s)).  Note: In addition to lab results from this visit, the labs listed above may include labs taken at another facility or during a different encounter within the last 24 hours. Please correlate lab times with ED admission and discharge times for further clarification of the services performed during this visit.    XR Hip With or Without Pelvis 2 - 3 View Left   Preliminary Result   No acute bony abnormality.       DICTATED:   08/17/2019   EDITED/ls :   08/17/2019                 Vitals:    08/17/19 1706 08/17/19 1717 08/17/19 1730 08/17/19 1829   BP:    103/73   BP Location:       Patient Position:       Pulse: 84   86   Resp:       Temp:       TempSrc:       SpO2: 98% 100% 100% 98%   Weight:       Height:         Medications   ketorolac (TORADOL) injection 60 mg (60 mg Intramuscular Given 8/17/19 1712)   triamcinolone acetonide (KENALOG-40) injection 80 mg (80 mg Intramuscular Given 8/17/19 1712)   HYDROcodone-acetaminophen (NORCO) 7.5-325 MG per tablet 1 tablet (1 tablet Oral Given 8/17/19 1706)   ondansetron (ZOFRAN) tablet 4 mg (4 mg Oral Given 8/17/19 1704)     ECG/EMG Results (last 24 hours)     ** No results found for the last 24 hours. **        No orders to display                     MDM    Final diagnoses:   Acute hip pain, left   History of rheumatoid arthritis       Documentation assistance provided by chapo Marcano.  Information recorded by the chapo was done at my direction and has been verified and validated by me.     Manuel Marcano  08/17/19 1705       Imelda Leonard APRN  08/17/19 1715       Imelda Leonard APRN  08/17/19 1644     yes

## 2019-08-17 NOTE — DISCHARGE INSTRUCTIONS
Steroid dose pack to begin on Monday morning;  call your Rheumatologist on Monday for follow up within the week to monitor your recovery.  Return to the ED as needed for worsening symptoms or concerns, including, but not limited to:  fever, intractable pain.

## 2020-07-23 ENCOUNTER — HOSPITAL ENCOUNTER (EMERGENCY)
Facility: HOSPITAL | Age: 30
Discharge: HOME | End: 2020-07-23
Attending: EMERGENCY MEDICINE | Admitting: EMERGENCY MEDICINE

## 2020-07-23 VITALS
HEART RATE: 80 BPM | HEIGHT: 66 IN | SYSTOLIC BLOOD PRESSURE: 116 MMHG | WEIGHT: 165 LBS | OXYGEN SATURATION: 95 % | TEMPERATURE: 99 F | DIASTOLIC BLOOD PRESSURE: 85 MMHG | RESPIRATION RATE: 14 BRPM | BODY MASS INDEX: 26.52 KG/M2

## 2020-07-23 DIAGNOSIS — M06.9 RHEUMATOID ARTHRITIS FLARE (HCC): Primary | ICD-10-CM

## 2020-07-23 PROCEDURE — 25010000002 HYDROMORPHONE 1 MG/ML SOLUTION: Performed by: EMERGENCY MEDICINE

## 2020-07-23 PROCEDURE — 25010000002 DEXAMETHASONE PER 1 MG

## 2020-07-23 PROCEDURE — 96372 THER/PROPH/DIAG INJ SC/IM: CPT

## 2020-07-23 PROCEDURE — 63710000001 ONDANSETRON ODT 4 MG TABLET DISPERSIBLE: Performed by: EMERGENCY MEDICINE

## 2020-07-23 PROCEDURE — 99283 EMERGENCY DEPT VISIT LOW MDM: CPT

## 2020-07-23 PROCEDURE — 25010000002 HYDROMORPHONE PER 4 MG: Performed by: EMERGENCY MEDICINE

## 2020-07-23 RX ORDER — DEXAMETHASONE SODIUM PHOSPHATE 4 MG/ML
10 INJECTION, SOLUTION INTRA-ARTICULAR; INTRALESIONAL; INTRAMUSCULAR; INTRAVENOUS; SOFT TISSUE ONCE
Status: DISCONTINUED | OUTPATIENT
Start: 2020-07-23 | End: 2020-07-23

## 2020-07-23 RX ORDER — DEXAMETHASONE SODIUM PHOSPHATE 10 MG/ML
10 INJECTION INTRAMUSCULAR; INTRAVENOUS ONCE
Status: COMPLETED | OUTPATIENT
Start: 2020-07-23 | End: 2020-07-23

## 2020-07-23 RX ORDER — HYDROMORPHONE HYDROCHLORIDE 1 MG/ML
0.5 INJECTION, SOLUTION INTRAMUSCULAR; INTRAVENOUS; SUBCUTANEOUS ONCE
Status: COMPLETED | OUTPATIENT
Start: 2020-07-23 | End: 2020-07-23

## 2020-07-23 RX ORDER — TRAMADOL HYDROCHLORIDE 50 MG/1
50 TABLET ORAL EVERY 6 HOURS PRN
COMMUNITY

## 2020-07-23 RX ORDER — ONDANSETRON 4 MG/1
4 TABLET, ORALLY DISINTEGRATING ORAL ONCE
Status: COMPLETED | OUTPATIENT
Start: 2020-07-23 | End: 2020-07-23

## 2020-07-23 RX ORDER — ACETAMINOPHEN 325 MG/1
650 TABLET ORAL EVERY 8 HOURS
COMMUNITY
End: 2020-08-24 | Stop reason: HOSPADM

## 2020-07-23 RX ADMIN — DEXAMETHASONE SODIUM PHOSPHATE 10 MG: 10 INJECTION INTRAMUSCULAR; INTRAVENOUS at 07:06

## 2020-07-23 RX ADMIN — HYDROMORPHONE HYDROCHLORIDE 1 MG: 1 INJECTION, SOLUTION INTRAMUSCULAR; INTRAVENOUS; SUBCUTANEOUS at 07:03

## 2020-07-23 RX ADMIN — ONDANSETRON 4 MG: 4 TABLET, ORALLY DISINTEGRATING ORAL at 07:03

## 2020-07-23 RX ADMIN — HYDROMORPHONE HYDROCHLORIDE 0.5 MG: 1 INJECTION, SOLUTION INTRAMUSCULAR; INTRAVENOUS; SUBCUTANEOUS at 08:15

## 2020-07-23 NOTE — ED PROVIDER NOTES
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 Mckinney for rheumatoid arthritis.  She was driven to the ER by her  this morning for a flare of rheumatoid arthritis.    She has had RA for approximately 3 years is generally done pretty well with it on  semsavir x 1 year.  She started to have problems with COVID approximately a month ago and she had a stop her biologic and seems like her RA is flared since then.  She had been on tramadol and low-dose prednisone at 5 mg a day and generally doing pretty well but this morning just felt awful.  Her joints ache all over but especially her left shoulder and her back.  In general her morning gel last about 6 hours but is been worse recently.  She also saw the rheumatologist last week and was told that her thyroid antibodies were positive and she was having a bit of throat pain but no swelling or difficulty with speech.    She has had no fevers or chills and she has had no injury.  Her COVID symptoms are better and she was retested recently and was negative.  She has had slight constipation from the tramadol but has had no dysuria and denies current pregnancy.  Her last tramadol was about 3 AM.        All other systems reviewed and are negative except as noted above.          Review of Systems   All other systems reviewed and are negative.      Past Medical History:   Diagnosis Date   • Depression    • Rheumatoid arthritis (CMS/[facility])        Allergies   Allergen Reactions   • Bactrim [Sulfamethoxazole-Trimethoprim] Unknown - Low Severity   • Motrin [Ibuprofen] Swelling     ONLY BRAND NAME MOTRIN       Past Surgical History:   Procedure Laterality Date   • MANDIBLE SURGERY         History reviewed. No pertinent family history.    Social History     Socioeconomic History   • Marital status:      Spouse name: Not on file   • Number of children: Not on file   • Years of education: Not on file   • Highest education level: Not on file   Tobacco  Use   • Smoking status: Never Smoker   • Smokeless tobacco: Never Used   Substance and Sexual Activity   • Alcohol use: Yes     Comment: 2-3X WEEK    • Drug use: No   • Sexual activity: Yes           Objective   Physical Exam   Constitutional:   This is a young adult who looks uncomfortable she is alert and oriented   HENT:   Head: Normocephalic and atraumatic.   Right Ear: External ear normal.   Left Ear: External ear normal.   Nose: Nose normal.   Mouth/Throat: Oropharynx is clear and moist.   Eyes: Pupils are equal, round, and reactive to light. Conjunctivae and EOM are normal.   Neck: Normal range of motion. Neck supple.   Her neck is normal to inspection I carefully feel for thyroid gland she is little bit tender but I do not feel any discrete mass her trachea is midline there is no stridor present.  No lymphadenopathy is present.   Cardiovascular: Intact distal pulses.   Pulmonary/Chest: Effort normal.   Abdominal: Soft.   She is nontender   Musculoskeletal:   She has gloves on due to joint pain.  Her fingers showed good pulses there is no sausage digit is present but probably is just a mild synovitis in her hands and her wrist bilaterally.  Range of motion of her hips and especially her shoulders is fair but it seems to cause her pain.  She is modest tenderness to palpation along her entire spine.  She had good pulses in her feet tattoo on the right foot no active synovitis sign seen ankles or feet.   Skin: Skin is warm and dry. Capillary refill takes less than 2 seconds.   Psychiatric: She has a normal mood and affect. Her behavior is normal.   Nursing note and vitals reviewed.      Procedures           ED Course  ED Course as of Jul 24 1115   Thu Jul 23, 2020   0821 Check patient was a little bit more comfortable but still had quite a bit of joint pain.  I offered her another dose of pain medicine which she accepted.  Her  is at bedside and I discussed importance of follow-up with rheumatology with  "him as well.All agreeable with plan    [ER]      ED Course User Index  [ER] Darshan Abraham MD      No results found for this or any previous visit (from the past 24 hour(s)).  Note: In addition to lab results from this visit, the labs listed above may include labs taken at another facility or during a different encounter within the last 24 hours. Please correlate lab times with ED admission and discharge times for further clarification of the services performed during this visit.    No orders to display     Vitals:    07/23/20 0625 07/23/20 0854   BP: 116/85    BP Location: Right arm    Patient Position: Sitting    Pulse: 98 80   Resp: 16 14   Temp: 99 °F (37.2 °C)    TempSrc: Oral    SpO2: 97% 95%   Weight: 74.8 kg (165 lb)    Height: 167.6 cm (66\")      Medications   HYDROmorphone (DILAUDID) injection 1 mg (1 mg Intramuscular Given 7/23/20 0703)   ondansetron ODT (ZOFRAN-ODT) disintegrating tablet 4 mg (4 mg Oral Given 7/23/20 0703)   dexamethasone (DECADRON) injection 10 mg (10 mg Intramuscular Given 7/23/20 0706)   HYDROmorphone (DILAUDID) injection 0.5 mg (0.5 mg Intramuscular Given 7/23/20 0815)     ECG/EMG Results (last 24 hours)     ** No results found for the last 24 hours. **        No orders to display                                          MDM  Number of Diagnoses or Management Options  Rheumatoid arthritis flare (CMS/HCC):   Diagnosis management comments:       This lady is having a flare of her RA currently.  Discussed with the the fact I think I give her some temporary relief with a shot of Decadron here and some Dilaudid however due to the chronic nature of rheumatoid arthritis and the fact she is on a biologic I really think she needs to call her rheumatologist to see if they need to adjust her underlying steroid dose or adjust the dose of biologic or consider another agent.  She understands this and promises to follow-up.    LXE Almanza is reviewed #[id number]    All are agreeable with the " plan.      Final diagnoses:   Rheumatoid arthritis flare (CMS/[facility])            Darshan Abraham MD  07/23/20 0719       Darshan Abraham MD  07/23/20 0720       Darshan Abraham MD  07/24/20 1110

## 2020-08-24 ENCOUNTER — APPOINTMENT (OUTPATIENT)
Dept: ULTRASOUND IMAGING | Facility: HOSPITAL | Age: 30
End: 2020-08-24

## 2020-08-24 ENCOUNTER — HOSPITAL ENCOUNTER (EMERGENCY)
Facility: HOSPITAL | Age: 30
Discharge: HOME | End: 2020-08-24
Attending: EMERGENCY MEDICINE | Admitting: EMERGENCY MEDICINE

## 2020-08-24 VITALS
SYSTOLIC BLOOD PRESSURE: 100 MMHG | HEIGHT: 66 IN | OXYGEN SATURATION: 97 % | HEART RATE: 86 BPM | RESPIRATION RATE: 16 BRPM | BODY MASS INDEX: 26.52 KG/M2 | WEIGHT: 165 LBS | DIASTOLIC BLOOD PRESSURE: 90 MMHG | TEMPERATURE: 98.2 F

## 2020-08-24 DIAGNOSIS — O20.0 THREATENED MISCARRIAGE IN EARLY PREGNANCY: Primary | ICD-10-CM

## 2020-08-24 DIAGNOSIS — O46.90 VAGINAL BLEEDING DURING PREGNANCY: ICD-10-CM

## 2020-08-24 LAB
ABO GROUP BLD: NORMAL
BASOPHILS # BLD AUTO: 0.08 10*3/MM3 (ref 0–0.2)
BASOPHILS NFR BLD AUTO: 0.7 % (ref 0–1.5)
DEPRECATED RDW RBC AUTO: 47.5 FL (ref 37–54)
EOSINOPHIL # BLD AUTO: 0.02 10*3/MM3 (ref 0–0.4)
EOSINOPHIL NFR BLD AUTO: 0.2 % (ref 0.3–6.2)
ERYTHROCYTE [DISTWIDTH] IN BLOOD BY AUTOMATED COUNT: 13.3 % (ref 12.3–15.4)
HCG INTACT+B SERPL-ACNC: 7902 MIU/ML
HCT VFR BLD AUTO: 36.1 % (ref 34–46.6)
HGB BLD-MCNC: 11.4 G/DL (ref 12–15.9)
HOLD SPECIMEN: NORMAL
HOLD SPECIMEN: NORMAL
IMM GRANULOCYTES # BLD AUTO: 0.05 10*3/MM3 (ref 0–0.05)
IMM GRANULOCYTES NFR BLD AUTO: 0.4 % (ref 0–0.5)
LYMPHOCYTES # BLD AUTO: 1.4 10*3/MM3 (ref 0.7–3.1)
LYMPHOCYTES NFR BLD AUTO: 12.1 % (ref 19.6–45.3)
MCH RBC QN AUTO: 30.3 PG (ref 26.6–33)
MCHC RBC AUTO-ENTMCNC: 31.6 G/DL (ref 31.5–35.7)
MCV RBC AUTO: 96 FL (ref 79–97)
MONOCYTES # BLD AUTO: 0.62 10*3/MM3 (ref 0.1–0.9)
MONOCYTES NFR BLD AUTO: 5.4 % (ref 5–12)
NEUTROPHILS NFR BLD AUTO: 81.2 % (ref 42.7–76)
NEUTROPHILS NFR BLD AUTO: 9.37 10*3/MM3 (ref 1.7–7)
NRBC BLD AUTO-RTO: 0 /100 WBC (ref 0–0.2)
NUMBER OF DOSES: NORMAL
PLATELET # BLD AUTO: 222 10*3/MM3 (ref 140–450)
PMV BLD AUTO: 10.9 FL (ref 6–12)
RBC # BLD AUTO: 3.76 10*6/MM3 (ref 3.77–5.28)
RH BLD: POSITIVE
WBC # BLD AUTO: 11.54 10*3/MM3 (ref 3.4–10.8)
WHOLE BLOOD HOLD SPECIMEN: NORMAL
WHOLE BLOOD HOLD SPECIMEN: NORMAL

## 2020-08-24 PROCEDURE — 85025 COMPLETE CBC W/AUTO DIFF WBC: CPT | Performed by: EMERGENCY MEDICINE

## 2020-08-24 PROCEDURE — 76817 TRANSVAGINAL US OBSTETRIC: CPT

## 2020-08-24 PROCEDURE — 86900 BLOOD TYPING SEROLOGIC ABO: CPT | Performed by: EMERGENCY MEDICINE

## 2020-08-24 PROCEDURE — 84702 CHORIONIC GONADOTROPIN TEST: CPT | Performed by: EMERGENCY MEDICINE

## 2020-08-24 PROCEDURE — 99283 EMERGENCY DEPT VISIT LOW MDM: CPT

## 2020-08-24 PROCEDURE — 86901 BLOOD TYPING SEROLOGIC RH(D): CPT | Performed by: EMERGENCY MEDICINE

## 2020-08-24 RX ORDER — SODIUM CHLORIDE 0.9 % (FLUSH) 0.9 %
10 SYRINGE (ML) INJECTION AS NEEDED
Status: DISCONTINUED | OUTPATIENT
Start: 2020-08-24 | End: 2020-08-24 | Stop reason: HOSPADM

## 2020-08-24 NOTE — DISCHARGE INSTRUCTIONS
Call Dr. Hughes's office today to schedule appointment and repeat blood work.  Pelvic rest, which means no intercourse, until cleared by your OB/GYN.  No heavy lifting, no strenuous activity.  Return to the emergency department immediately if any change or worsening of symptoms.

## 2020-08-24 NOTE — ED PROVIDER NOTES
EMERGENCY DEPARTMENT ENCOUNTER    Room Number:    Date of encounter:  2020  PCP: Herber Bass MD  Historian: Patient      HPI:  Chief Complaint: Pregnant, vaginal bleeding.    A complete HPI/ROS/PMH/PSH/SH/FH are unobtainable due to: NA    Context: Nicanor Butler is a 30 y.o. female who presents to the ED c/o onset of pink vaginal discharge approximately 5 days ago that has progressed to light bleeding.  She has now having some sharp cramping middle right side of her low abdomen.  Said the pain occasionally doubles her over, takes approximate 30 to 45seconds to tejinder.  Associate with slight nausea.  No fevers chills or sweats, no other vaginal discharge.  She is approximately 5 weeks pregnant, G2, .  She says that she did have an elective  with her last pregnancy.      PAST MEDICAL HISTORY  Active Ambulatory Problems     Diagnosis Date Noted   • No Active Ambulatory Problems     Resolved Ambulatory Problems     Diagnosis Date Noted   • No Resolved Ambulatory Problems     Past Medical History:   Diagnosis Date   • Depression    • Rheumatoid arthritis (CMS/[facility])          PAST SURGICAL HISTORY  Past Surgical History:   Procedure Laterality Date   • MANDIBLE SURGERY           FAMILY HISTORY  No family history on file.      SOCIAL HISTORY  Social History     Socioeconomic History   • Marital status:      Spouse name: Not on file   • Number of children: Not on file   • Years of education: Not on file   • Highest education level: Not on file   Tobacco Use   • Smoking status: Never Smoker   • Smokeless tobacco: Never Used   Substance and Sexual Activity   • Alcohol use: Yes     Comment: 2-3X WEEK    • Drug use: No   • Sexual activity: Yes         ALLERGIES  Bactrim [sulfamethoxazole-trimethoprim] and Motrin [ibuprofen]        REVIEW OF SYSTEMS  Review of Systems     All systems reviewed and negative except for those discussed in HPI.       PHYSICAL EXAM    I have reviewed the triage vital  signs and nursing notes.    ED Triage Vitals [20 1011]   Temp Heart Rate Resp BP SpO2   98.2 °F (36.8 °C) 88 16 118/75 98 %      Temp src Heart Rate Source Patient Position BP Location FiO2 (%)   Tympanic Monitor Sitting Left arm --       Physical Exam  GENERAL: Awake alert and oriented afebrile not toxic appearing hemodynamically stable.  Well developed.  Appears in no acute distress.  HENT: Nares patent  EYES: No scleral icterus  CV: Regular rhythm, regular rate  RESPIRATORY: Normal effort.  No audible wheezes, rales or rhonchi  ABDOMEN: Soft, nontender no guarding or rebound tenderness.  Bowel sounds are normal.  No CVA or suprapubic tenderness.  MUSCULOSKELETAL: No deformities.   NEURO: Alert, moves all extremities, follows commands  SKIN: Warm, dry, no rash visualized        LAB RESULTS  Recent Results (from the past 24 hour(s))   hCG, Quantitative, Pregnancy    Collection Time: 20 11:01 AM   Result Value Ref Range    HCG Quantitative 7,902.00 mIU/mL    RhIg Evaluation    Collection Time: 20 11:01 AM   Result Value Ref Range    ABO Type A     RH type Positive    Doses of Rh Immune Globulin    Collection Time: 20 11:01 AM   Result Value Ref Range    Number of Doses       RhIg is not indicated due to the patient's Rh status   Light Blue Top    Collection Time: 20 11:01 AM   Result Value Ref Range    Extra Tube hold for add-on    Green Top (Gel)    Collection Time: 20 11:01 AM   Result Value Ref Range    Extra Tube Hold for add-ons.    Lavender Top    Collection Time: 20 11:01 AM   Result Value Ref Range    Extra Tube     Gold Top - SST    Collection Time: 20 11:01 AM   Result Value Ref Range    Extra Tube Hold for add-ons.    CBC Auto Differential    Collection Time: 20 11:01 AM   Result Value Ref Range    WBC 11.54 (H) 3.40 - 10.80 10*3/mm3    RBC 3.76 (L) 3.77 - 5.28 10*6/mm3    Hemoglobin 11.4 (L) 12.0 - 15.9 g/dL    Hematocrit 36.1 34.0 - 46.6 %     MCV 96.0 79.0 - 97.0 fL    MCH 30.3 26.6 - 33.0 pg    MCHC 31.6 31.5 - 35.7 g/dL    RDW 13.3 12.3 - 15.4 %    RDW-SD 47.5 37.0 - 54.0 fl    MPV 10.9 6.0 - 12.0 fL    Platelets 222 140 - 450 10*3/mm3    Neutrophil % 81.2 (H) 42.7 - 76.0 %    Lymphocyte % 12.1 (L) 19.6 - 45.3 %    Monocyte % 5.4 5.0 - 12.0 %    Eosinophil % 0.2 (L) 0.3 - 6.2 %    Basophil % 0.7 0.0 - 1.5 %    Immature Grans % 0.4 0.0 - 0.5 %    Neutrophils, Absolute 9.37 (H) 1.70 - 7.00 10*3/mm3    Lymphocytes, Absolute 1.40 0.70 - 3.10 10*3/mm3    Monocytes, Absolute 0.62 0.10 - 0.90 10*3/mm3    Eosinophils, Absolute 0.02 0.00 - 0.40 10*3/mm3    Basophils, Absolute 0.08 0.00 - 0.20 10*3/mm3    Immature Grans, Absolute 0.05 0.00 - 0.05 10*3/mm3    nRBC 0.0 0.0 - 0.2 /100 WBC       Ordered the above labs and independently reviewed the results.        RADIOLOGY  Us Ob Transvaginal    Result Date: 8/24/2020  EXAMINATION: US OB TRANSVAGINAL-  INDICATION: Five weeks pregnant, light bleeding.  COMPARISON: None.  FINDINGS: The uterus measures approximately 8.0 x 3.8 x 6.0 cm. There is an single intrauterine gestational sac measuring approximately six weeks one day. A fetal pole is seen measuring five weeks five days by crown-rump length. Fetal heart rate is detectable, shown as 81 bpm. There is a very small subchorionic hemorrhage.  The right ovary measures approximately 2.0 x 1.6 x 1.5 cm and contains a 1 cm cyst. Left ovary measures approximately 3.2 x 1.5 x 2.1 cm and contains a rounded hypoechoic 2.2 cm area, most likely a corpus luteum cyst. There is no significant associated color Doppler flow associated with the cyst. There is expected color Doppler flow in both ovaries. No significant intrapelvic free fluid is seen.      1. Single IUP approximately five weeks five days by crown-rump length, fetal heart rate shown as 81 bpm. Very small subchorionic hemorrhage. 2. Probable 2.2 cm left corpus luteum cyst. 3. One cm follicular cyst in the right ovary.   D:  08/242020 E:  08/24/2020            PROCEDURES    Procedures      MEDICATIONS GIVEN IN ER    Medications - No data to display      PROGRESS, DATA ANALYSIS, CONSULTS, AND MEDICAL DECISION MAKING    All labs have been independently reviewed by me.  All radiology studies have been reviewed by me and the radiologist dictating the report.   EKG's have been independently viewed and interpreted by me.          ED Course as of Aug 24 1652   Mon Aug 24, 2020   1204 IMPRESSION:  1. Single IUP approximately five weeks five days by crown-rump length,  fetal heart rate shown as 81 bpm. Very small subchorionic hemorrhage.  2. Probable 2.2 cm left corpus luteum cyst.     3. 1 cm follicular cyst in the right ovary.    [TG]      ED Course User Index  [TG] Tripp Gomez PA-C       Findings reviewed with patient.  Recommend pelvic rest, no strenuous activity.  Refer to Dr. Hughes for follow-up evaluation and repeat blood work later this week or early next.  Signs and symptoms of worsening condition reviewed.  She was advised to have a low threshold to return if any change or worsening of symptoms.  She verbalized understanding and is agreeable to plan.      AS OF 16:52 VITALS:    BP - 100/90  HR - 86  TEMP - 98.2 °F (36.8 °C) (Tympanic)  O2 SATS - 97%        DIAGNOSIS  Final diagnoses:   Threatened miscarriage in early pregnancy   Vaginal bleeding during pregnancy         DISPOSITION  DISCHARGE    Patient discharged in stable condition.    Reviewed implications of results, diagnosis, meds, responsibility to follow up, warning signs and symptoms of possible worsening, potential complications and reasons to return to ER.    Patient/Family voiced understanding of above instructions.    Discussed plan for discharge, as there is no emergent indication for admission.  Pt/family is agreeable and understands need for follow up and possible repeat testing.  Pt/family is aware that discharge does not mean that nothing is wrong but  that it indicates no emergency is currently present that requires admission and they must continue care with follow-up as given below or with a physician of their choice.     FOLLOW-UP  Nadege Hughes MD  [address] [id number] [phone]               Medication List      Changed    predniSONE 10 MG tablet  Commonly known as:  DELTASONE  Take 4 po qd x 3 d, then 3 po qd x 3d, then  2 po qd x 3d, then 1 po qd  What changed:    how much to take  when to take this        Stop    acetaminophen 325 MG tablet  Commonly known as:  Tripp Ashraf PA-C  08/24/20 8267

## 2020-10-12 ENCOUNTER — LAB (OUTPATIENT)
Dept: LAB | Facility: HOSPITAL | Age: 30
End: 2020-10-12

## 2020-10-12 ENCOUNTER — OFFICE VISIT (OUTPATIENT)
Dept: ENDOCRINOLOGY | Facility: CLINIC | Age: 30
End: 2020-10-12

## 2020-10-12 VITALS
SYSTOLIC BLOOD PRESSURE: 132 MMHG | WEIGHT: 175 LBS | HEIGHT: 66 IN | DIASTOLIC BLOOD PRESSURE: 60 MMHG | OXYGEN SATURATION: 96 % | BODY MASS INDEX: 28.12 KG/M2 | HEART RATE: 78 BPM

## 2020-10-12 DIAGNOSIS — E04.1 THYROID NODULE: Primary | ICD-10-CM

## 2020-10-12 DIAGNOSIS — R76.8 THYROID ANTIBODY POSITIVE: ICD-10-CM

## 2020-10-12 LAB — TSH SERPL DL<=0.05 MIU/L-ACNC: 1.32 UIU/ML (ref 0.27–4.2)

## 2020-10-12 PROCEDURE — 76536 US EXAM OF HEAD AND NECK: CPT | Performed by: INTERNAL MEDICINE

## 2020-10-12 PROCEDURE — 84443 ASSAY THYROID STIM HORMONE: CPT | Performed by: INTERNAL MEDICINE

## 2020-10-12 PROCEDURE — 99203 OFFICE O/P NEW LOW 30 MIN: CPT | Performed by: INTERNAL MEDICINE

## 2020-10-12 NOTE — PROGRESS NOTES
Chief Complaint   Patient presents with   • elevated thyroid antibody and globus sensation     Consult for JESSE Cheney       HPI:   Nicanor Butler is a 30 y.o.female sent in consultation by JESSE Blanton for further evaluation of elevated thyroid antibody level and globus sensation. Her history is as follows:    - Patient has medical history significant for rheumatoid arthritis diagnosed in 2017.  Has been taking prednisone 5 mg daily since June 2020.  Patient reports persistent intermittent sharp pain in the right anterior side of her neck and a soreness in her throat since March 2020.  She has had an EGD recently that showed no pathology.  Currently not on a PPI.  Currently not on any antihistamines for allergic rhinitis.     Labs completed by her rheumatology provider on 7/14/2020 showed normal thyroid function with a minimally elevated TG antibody level and normal TPO antibody level: (07/14/2020) TSH 1.560, TPO Ab <9, TG Ab 3.4 (0 - 0.9).      FMH: Patient is adopted, unknown to her  PMH: no h/o irradiation of head, neck, or chest    Review of Systems   Constitutional: Positive for fatigue.   HENT: Positive for sore throat.    Eyes: Negative.    Respiratory: Negative.    Cardiovascular: Negative.    Gastrointestinal: Positive for constipation.   Endocrine: Positive for heat intolerance.   Genitourinary: Negative.    Musculoskeletal: Positive for arthralgias, joint swelling, myalgias and neck pain.        Chronic pain   Skin: Negative.    Allergic/Immunologic: Negative.    Neurological: Positive for dizziness and headaches.   Hematological: Negative.    Psychiatric/Behavioral: Positive for dysphoric mood, sleep disturbance and suicidal ideas (no current plan). Negative for hallucinations. The patient is nervous/anxious.      Past Medical History:   Diagnosis Date   • Depression    • Rheumatoid arthritis (CMS/HCC)      Family history is unknown by patient.  Past Surgical History:  "  Procedure Laterality Date   • MANDIBLE SURGERY       Social History     Tobacco Use   • Smoking status: Former Smoker     Types: Cigarettes     Quit date: 10/12/2010     Years since quitting: 10.0   • Smokeless tobacco: Never Used   Substance Use Topics   • Alcohol use: Yes     Comment: 2-3X WEEK    • Drug use: No     Outpatient Medications Prior to Visit   Medication Sig Dispense Refill   • predniSONE (DELTASONE) 10 MG tablet Take 4 po qd x 3 d, then 3 po qd x 3d, then  2 po qd x 3d, then 1 po qd (Patient taking differently: 5 mg Daily. Take 4 po qd x 3 d, then 3 po qd x 3d, then  2 po qd x 3d, then 1 po qd) 60 tablet 0   • traMADol (ULTRAM) 50 MG tablet Take 50 mg by mouth Every 6 (Six) Hours As Needed for Moderate Pain .       No facility-administered medications prior to visit.      Allergies   Allergen Reactions   • Bactrim [Sulfamethoxazole-Trimethoprim] Unknown - Low Severity   • Motrin [Ibuprofen] Swelling     ONLY BRAND NAME MOTRIN       /60   Pulse 78   Ht 167.6 cm (66\")   Wt 79.4 kg (175 lb)   LMP 07/16/2020   SpO2 96%   BMI 28.25 kg/m²   Physical Exam  Vitals signs reviewed.   Constitutional:       General: She is not in acute distress.     Appearance: She is well-developed. She is not diaphoretic.   HENT:      Head: Normocephalic.   Eyes:      Conjunctiva/sclera: Conjunctivae normal.      Pupils: Pupils are equal, round, and reactive to light.   Neck:      Thyroid: No thyromegaly.      Trachea: No tracheal deviation.      Comments: No palpable thyroid nodules  Bilateral sternocleidomastoid muscles tender to palpation  Thyroid gland was nontender to palpation  Cardiovascular:      Rate and Rhythm: Normal rate and regular rhythm.      Heart sounds: Normal heart sounds. No murmur.   Pulmonary:      Effort: Pulmonary effort is normal. No respiratory distress.      Breath sounds: Normal breath sounds.   Lymphadenopathy:      Cervical: No cervical adenopathy.   Skin:     General: Skin is warm " and dry.      Findings: No erythema.   Neurological:      Mental Status: She is alert and oriented to person, place, and time.      Cranial Nerves: No cranial nerve deficit.   Psychiatric:         Behavior: Behavior normal.       LABS/IMAGING: outside records reviewed and summarized in HPI  Results for orders placed or performed in visit on 10/12/20   TSH    Specimen: Blood   Result Value Ref Range    TSH 1.320 0.270 - 4.200 uIU/mL     PROCEDURES (in office):  Thyroid Ultrasound Report  [facility]  [place], [place]    Date: 10/12/2020  Indication: thyroid nodule  Comparison Imaging: none  Clinical History: 30 y.o. female with h/o rheumatoid arthritis. Reports pain in right side of anterior neck and globus sensation. Has normal TFT's, normal TPO Ab, slightly increased TG Ab level.    Real time high resolution imaging of the thyroid gland was performed in transverse and longitudinal planes.  The right lobe measured 4.28 cm in Length x 1.44 cm in AP diameter x 1.63 cm in TV dimension.  The isthmus measured 0.35 cm in thickness.  The left thyroid lobe measured 4.03 cm in length x 1.10 cm in AP diameter x 1.47 cm in TV dimension.    The thyroid gland appeared overall isoechoic with slightly heterogeneous echotexture throughout the gland.     A subcentimeter (0.31 cm) thyroid cyst was identified in the right superior lobe.    In the right mid lobe, posteriorly, a 0.62 (L) x 0.41 (AP) x 0.42 (T) cm hyperechoic, solid nodule was identified.  The nodule had a smooth margin, minimal peripheral vascularity, and no microcalcifications.    In the left mid lobe, posteriorly, a 0.62 (L) x 0.21 (AP) x 0.43 (T) cm hyperechoic, solid nodule was identified.  The nodule had a smooth margin, minimal peripheral vascularity, and no microcalcifications.    No pathologic lymph nodes were seen.     IMPRESSION:   1) The thyroid gland was normal in size by measured dimensions.   2) A subcentimeter, hyperechoic, solid nodule was  identified in each mid lobe as described above.  The nodules had a smooth margin, minimal peripheral vascularity, and no microcalcifications.  3) These nodules lacked suspicious ultrasound characteristics and did not meet criteria for FNA.    RECOMMENDATIONS: Repeat Thyroid US recommended if changes in the gland are noted on physical exam.     Signed: Yamila Joseph MD      ASSESSMENT/PLAN:  1) thyroid nodule: Thyroid ultrasound completed in clinic today showed -   - The thyroid gland was normal in size by measured dimensions.   - A subcentimeter, hyperechoic, solid nodule was identified in each mid lobe as described above.  The nodules had a smooth margin, minimal peripheral vascularity, and no microcalcifications.  - These nodules lacked suspicious ultrasound characteristics and did not meet criteria for FNA.    RECOMMENDATIONS: Repeat Thyroid US recommended if changes in the gland are noted on physical exam.     Reviewed images with patient.  The pain in her anterior neck appeared to be musculoskeletal and not related to her thyroid gland.     2) positive thyroglobulin ab:  - discussed with pt that thyroid Abs can be found, in lower concentration, in many people with no clinical or biochemical evidence of thyroid hormone dysfunction. On average, up to 20 percent of all women may have such antibodies but do not have thyroid hormone dysfunction. Discussed with patient that nearly all patients with Hashimoto's thyroiditis have high serum concentrations of TPO antibodies and her TPO Ab level was < 9..Reassured pt that the slightly elevated TG ab level at this time is not clinically significant at this time.    - Repeat TSH level today was normal at 1.320  - Discussed with patient that her thyroid antibodies do not need to be repeated.  Advised her to have her TSH level checked if there are any concerning symptoms for hypothyroidism or hyperthyroidism.    RTC as needed    Signed: Yamila Joseph MD    Counseling was given  to patient for the following topics: clinical significance of thyroid nodules, risk factors for thyroid cancer, indications for FNA, and reviewing US images with patient, and reviewed lab results, see details in assessment/plan. Total face to face time of the encounter was 45 minutes and 30 minutes was spent counseling.

## 2020-10-27 NOTE — PROGRESS NOTES
Thyroid Ultrasound Report  [facility] Endocrin[facility]  [place], [place]    Date: 10/12/2020  Indication: thyroid nodule  Comparison Imaging: none  Clinical History: 30 y.o. female with h/o rheumatoid arthritis. Reports pain in right side of anterior neck and globus sensation. Has normal TFT's, normal TPO Ab, slightly increased TG Ab level.    Real time high resolution imaging of the thyroid gland was performed in transverse and longitudinal planes.  The right lobe measured 4.28 cm in Length x 1.44 cm in AP diameter x 1.63 cm in TV dimension.  The isthmus measured 0.35 cm in thickness.  The left thyroid lobe measured 4.03 cm in length x 1.10 cm in AP diameter x 1.47 cm in TV dimension.    The thyroid gland appeared overall isoechoic with slightly heterogeneous echotexture throughout the gland.     A subcentimeter (0.31 cm) thyroid cyst was identified in the right superior lobe.    In the right mid lobe, posteriorly, a 0.62 (L) x 0.41 (AP) x 0.42 (T) cm hyperechoic, solid nodule was identified.  The nodule had a smooth margin, minimal peripheral vascularity, and no microcalcifications.    In the left mid lobe, posteriorly, a 0.62 (L) x 0.21 (AP) x 0.43 (T) cm hyperechoic, solid nodule was identified.  The nodule had a smooth margin, minimal peripheral vascularity, and no microcalcifications.    No pathologic lymph nodes were seen.     IMPRESSION:   1) The thyroid gland was normal in size by measured dimensions.   2) A subcentimeter, hyperechoic, solid nodule was identified in each mid lobe as described above.  The nodules had a smooth margin, minimal peripheral vascularity, and no microcalcifications.  3) These nodules lacked suspicious ultrasound characteristics and did not meet criteria for FNA.    RECOMMENDATIONS: Repeat Thyroid US recommended if changes in the gland are noted on physical exam.     Signed: Yamila Joseph MD

## 2021-04-13 ENCOUNTER — HOSPITAL ENCOUNTER (EMERGENCY)
Facility: HOSPITAL | Age: 31
Discharge: HOME | End: 2021-04-14
Attending: EMERGENCY MEDICINE | Admitting: EMERGENCY MEDICINE

## 2021-04-13 ENCOUNTER — APPOINTMENT (OUTPATIENT)
Dept: CT IMAGING | Facility: HOSPITAL | Age: 31
End: 2021-04-13

## 2021-04-13 VITALS
OXYGEN SATURATION: 95 % | WEIGHT: 175 LBS | DIASTOLIC BLOOD PRESSURE: 70 MMHG | TEMPERATURE: 97.8 F | HEIGHT: 66 IN | HEART RATE: 87 BPM | RESPIRATION RATE: 16 BRPM | BODY MASS INDEX: 28.12 KG/M2 | SYSTOLIC BLOOD PRESSURE: 109 MMHG

## 2021-04-13 DIAGNOSIS — R00.2 PALPITATIONS: ICD-10-CM

## 2021-04-13 DIAGNOSIS — R07.9 CHEST PAIN, UNSPECIFIED TYPE: Primary | ICD-10-CM

## 2021-04-13 LAB
ALBUMIN SERPL-MCNC: 3.6 G/DL (ref 3.5–5.2)
ALBUMIN/GLOB SERPL: 1.1 G/DL
ALP SERPL-CCNC: 55 U/L (ref 39–117)
ALT SERPL W P-5'-P-CCNC: 9 U/L (ref 1–33)
ANION GAP SERPL CALCULATED.3IONS-SCNC: 7 MMOL/L (ref 5–15)
AST SERPL-CCNC: 12 U/L (ref 1–32)
B-HCG UR QL: NEGATIVE
BASOPHILS # BLD AUTO: 0.08 10*3/MM3 (ref 0–0.2)
BASOPHILS NFR BLD AUTO: 0.9 % (ref 0–1.5)
BILIRUB SERPL-MCNC: 0.2 MG/DL (ref 0–1.2)
BILIRUB UR QL STRIP: NEGATIVE
BUN SERPL-MCNC: 8 MG/DL (ref 6–20)
BUN/CREAT SERPL: 10.7 (ref 7–25)
CALCIUM SPEC-SCNC: 8.2 MG/DL (ref 8.6–10.5)
CHLORIDE SERPL-SCNC: 109 MMOL/L (ref 98–107)
CLARITY UR: CLEAR
CO2 SERPL-SCNC: 25 MMOL/L (ref 22–29)
COLOR UR: YELLOW
CREAT SERPL-MCNC: 0.75 MG/DL (ref 0.57–1)
D DIMER PPP FEU-MCNC: 1.92 MCGFEU/ML (ref 0–0.56)
DEPRECATED RDW RBC AUTO: 45.8 FL (ref 37–54)
EOSINOPHIL # BLD AUTO: 0.08 10*3/MM3 (ref 0–0.4)
EOSINOPHIL NFR BLD AUTO: 0.9 % (ref 0.3–6.2)
ERYTHROCYTE [DISTWIDTH] IN BLOOD BY AUTOMATED COUNT: 13.5 % (ref 12.3–15.4)
GFR SERPL CREATININE-BSD FRML MDRD: 91 ML/MIN/1.73
GLOBULIN UR ELPH-MCNC: 3.4 GM/DL
GLUCOSE SERPL-MCNC: 102 MG/DL (ref 65–99)
GLUCOSE UR STRIP-MCNC: NEGATIVE MG/DL
HCT VFR BLD AUTO: 35.4 % (ref 34–46.6)
HGB BLD-MCNC: 11.1 G/DL (ref 12–15.9)
HGB UR QL STRIP.AUTO: NEGATIVE
IMM GRANULOCYTES # BLD AUTO: 0.02 10*3/MM3 (ref 0–0.05)
IMM GRANULOCYTES NFR BLD AUTO: 0.2 % (ref 0–0.5)
INTERNAL NEGATIVE CONTROL: NEGATIVE
INTERNAL POSITIVE CONTROL: POSITIVE
KETONES UR QL STRIP: NEGATIVE
LEUKOCYTE ESTERASE UR QL STRIP.AUTO: NEGATIVE
LIPASE SERPL-CCNC: 28 U/L (ref 13–60)
LYMPHOCYTES # BLD AUTO: 2.8 10*3/MM3 (ref 0.7–3.1)
LYMPHOCYTES NFR BLD AUTO: 30.7 % (ref 19.6–45.3)
Lab: NORMAL
MAGNESIUM SERPL-MCNC: 1.9 MG/DL (ref 1.6–2.6)
MCH RBC QN AUTO: 28.8 PG (ref 26.6–33)
MCHC RBC AUTO-ENTMCNC: 31.4 G/DL (ref 31.5–35.7)
MCV RBC AUTO: 91.7 FL (ref 79–97)
MONOCYTES # BLD AUTO: 0.67 10*3/MM3 (ref 0.1–0.9)
MONOCYTES NFR BLD AUTO: 7.3 % (ref 5–12)
NEUTROPHILS NFR BLD AUTO: 5.47 10*3/MM3 (ref 1.7–7)
NEUTROPHILS NFR BLD AUTO: 60 % (ref 42.7–76)
NITRITE UR QL STRIP: NEGATIVE
NRBC BLD AUTO-RTO: 0 /100 WBC (ref 0–0.2)
NT-PROBNP SERPL-MCNC: 89.4 PG/ML (ref 0–450)
PH UR STRIP.AUTO: 6.5 [PH] (ref 5–8)
PLATELET # BLD AUTO: 293 10*3/MM3 (ref 140–450)
PMV BLD AUTO: 10.5 FL (ref 6–12)
POTASSIUM SERPL-SCNC: 3.5 MMOL/L (ref 3.5–5.2)
PROT SERPL-MCNC: 7 G/DL (ref 6–8.5)
PROT UR QL STRIP: NEGATIVE
RBC # BLD AUTO: 3.86 10*6/MM3 (ref 3.77–5.28)
SODIUM SERPL-SCNC: 141 MMOL/L (ref 136–145)
SP GR UR STRIP: 1.02 (ref 1–1.03)
TROPONIN T SERPL-MCNC: <0.01 NG/ML (ref 0–0.03)
UROBILINOGEN UR QL STRIP: NORMAL
WBC # BLD AUTO: 9.12 10*3/MM3 (ref 3.4–10.8)

## 2021-04-13 PROCEDURE — 93005 ELECTROCARDIOGRAM TRACING: CPT | Performed by: EMERGENCY MEDICINE

## 2021-04-13 PROCEDURE — 80053 COMPREHEN METABOLIC PANEL: CPT | Performed by: EMERGENCY MEDICINE

## 2021-04-13 PROCEDURE — 85025 COMPLETE CBC W/AUTO DIFF WBC: CPT | Performed by: EMERGENCY MEDICINE

## 2021-04-13 PROCEDURE — 83880 ASSAY OF NATRIURETIC PEPTIDE: CPT | Performed by: EMERGENCY MEDICINE

## 2021-04-13 PROCEDURE — 83690 ASSAY OF LIPASE: CPT | Performed by: EMERGENCY MEDICINE

## 2021-04-13 PROCEDURE — 99283 EMERGENCY DEPT VISIT LOW MDM: CPT

## 2021-04-13 PROCEDURE — 81003 URINALYSIS AUTO W/O SCOPE: CPT | Performed by: EMERGENCY MEDICINE

## 2021-04-13 PROCEDURE — 0 IOPAMIDOL PER 1 ML: Performed by: EMERGENCY MEDICINE

## 2021-04-13 PROCEDURE — 83735 ASSAY OF MAGNESIUM: CPT | Performed by: EMERGENCY MEDICINE

## 2021-04-13 PROCEDURE — 85379 FIBRIN DEGRADATION QUANT: CPT | Performed by: EMERGENCY MEDICINE

## 2021-04-13 PROCEDURE — 81025 URINE PREGNANCY TEST: CPT | Performed by: EMERGENCY MEDICINE

## 2021-04-13 PROCEDURE — 84484 ASSAY OF TROPONIN QUANT: CPT | Performed by: EMERGENCY MEDICINE

## 2021-04-13 PROCEDURE — 71275 CT ANGIOGRAPHY CHEST: CPT

## 2021-04-13 RX ORDER — SODIUM CHLORIDE 0.9 % (FLUSH) 0.9 %
10 SYRINGE (ML) INJECTION AS NEEDED
Status: DISCONTINUED | OUTPATIENT
Start: 2021-04-13 | End: 2021-04-14 | Stop reason: HOSPADM

## 2021-04-13 RX ADMIN — IOPAMIDOL 58 ML: 755 INJECTION, SOLUTION INTRAVENOUS at 23:19

## 2021-04-14 LAB
QT INTERVAL: 356 MS
QTC INTERVAL: 415 MS

## 2021-04-14 NOTE — ED PROVIDER NOTES
Subjective   30-year-old female presents for evaluation of chest pain and palpitations.  The patient is a former smoker but has no other cardiac risk factors.  She states that this evening she added some nicotine to her vape and then after inhaling began experiencing chest pain and palpitations.  Her symptoms lasted for approximately 1 hour before improving spontaneously.  She states that she feels much better now but is still experiencing mild chest pressure and shortness of breath.  She denies any cough or fever.  No sick contacts.  No known exposures to anyone with COVID-19.  She states that she felt a bit dizzy when she was symptomatic as well.          Review of Systems   Respiratory: Positive for chest tightness and shortness of breath.    Cardiovascular: Positive for chest pain and palpitations.   Neurological: Positive for dizziness.   All other systems reviewed and are negative.      Past Medical History:   Diagnosis Date   • Depression    • Rheumatoid arthritis (CMS/[facility])        Allergies   Allergen Reactions   • Bactrim [Sulfamethoxazole-Trimethoprim] Unknown - Low Severity   • Motrin [Ibuprofen] Swelling     ONLY BRAND NAME MOTRIN       Past Surgical History:   Procedure Laterality Date   • MANDIBLE SURGERY         Family History   Family history unknown: Yes       Social History     Socioeconomic History   • Marital status:      Spouse name: Not on file   • Number of children: Not on file   • Years of education: Not on file   • Highest education level: Not on file   Tobacco Use   • Smoking status: Former Smoker     Types: Cigarettes     Quit date: 10/12/2010     Years since quitting: 10.5   • Smokeless tobacco: Never Used   Substance and Sexual Activity   • Alcohol use: Yes     Comment: 2-3X WEEK    • Drug use: No   • Sexual activity: Yes           Objective   Physical Exam  Vitals and nursing note reviewed.   Constitutional:       General: She is not in acute distress.     Appearance: She is  well-developed. She is not diaphoretic.      Comments: Well-appearing female in no acute distress   HENT:      Head: Normocephalic and atraumatic.   Eyes:      Pupils: Pupils are equal, round, and reactive to light.   Cardiovascular:      Rate and Rhythm: Normal rate and regular rhythm.      Heart sounds: Normal heart sounds. No murmur heard.   No friction rub. No gallop.    Pulmonary:      Effort: Pulmonary effort is normal. No respiratory distress.      Breath sounds: Normal breath sounds. No wheezing or rales.   Abdominal:      General: Bowel sounds are normal. There is no distension.      Palpations: Abdomen is soft. There is no mass.      Tenderness: There is no abdominal tenderness. There is no guarding or rebound.   Musculoskeletal:         General: Normal range of motion.      Cervical back: Normal range of motion and neck supple.      Right lower leg: No edema.      Left lower leg: No edema.   Skin:     General: Skin is warm and dry.      Findings: No erythema or rash.   Neurological:      General: No focal deficit present.      Mental Status: She is alert and oriented to person, place, and time.   Psychiatric:         Mood and Affect: Mood normal.         Thought Content: Thought content normal.         Judgment: Judgment normal.         Procedures           ED Course  ED Course as of Apr 14 0213   Wed Apr 14, 2021   0211 30-year-old female, otherwise healthy, presents for evaluation of chest pain/pressure and palpitations after adding nicotine to her vape tonight.  On arrival to the ED, the patient is nontoxic-appearing.  Her symptoms have significantly improved spontaneously.    [DD]   0211 EKG revealed sinus rhythm with marked sinus arrhythmia, heart rate of 82, and no ST segments suggestive of or concerning for ischemia.  Normal DC and QT intervals noted.  No EKG evidence of Brugada syndrome or hypertrophic cardiomyopathy.  No family history of sudden cardiac death.  Labs are unrevealing.  Low risk  Well's but D-dimer elevated.  Chest CTA is unremarkable.  Upon reevaluation, the patient feels well and states that her symptoms have resolved.  Doubt ACS, PE, dissection, or emergent cardiothoracic process at this time based on exam, history, clinical presentation, gestalt, and objective findings in the ED.  HEART score of 1.  Patient referred to our heart valve clinic for outpatient monitoring.  I advised her to follow-up with her PCP as needed as well.  Agreeable with plan and given appropriate strict return precautions.    [DD]      ED Course User Index  [DD] Jason Barrera MD                                 Recent Results (from the past 24 hour(s))   Comprehensive Metabolic Panel    Collection Time: 04/13/21  9:50 PM    Specimen: Blood   Result Value Ref Range    Glucose 102 (H) 65 - 99 mg/dL    BUN 8 6 - 20 mg/dL    Creatinine 0.75 0.57 - 1.00 mg/dL    Sodium 141 136 - 145 mmol/L    Potassium 3.5 3.5 - 5.2 mmol/L    Chloride 109 (H) 98 - 107 mmol/L    CO2 25.0 22.0 - 29.0 mmol/L    Calcium 8.2 (L) 8.6 - 10.5 mg/dL    Total Protein 7.0 6.0 - 8.5 g/dL    Albumin 3.60 3.50 - 5.20 g/dL    ALT (SGPT) 9 1 - 33 U/L    AST (SGOT) 12 1 - 32 U/L    Alkaline Phosphatase 55 39 - 117 U/L    Total Bilirubin 0.2 0.0 - 1.2 mg/dL    eGFR Non African Amer 91 >60 mL/min/1.73    Globulin 3.4 gm/dL    A/G Ratio 1.1 g/dL    BUN/Creatinine Ratio 10.7 7.0 - 25.0    Anion Gap 7.0 5.0 - 15.0 mmol/L   Lipase    Collection Time: 04/13/21  9:50 PM    Specimen: Blood   Result Value Ref Range    Lipase 28 13 - 60 U/L   Troponin    Collection Time: 04/13/21  9:50 PM    Specimen: Blood   Result Value Ref Range    Troponin T <0.010 0.000 - 0.030 ng/mL   D-dimer, Quantitative    Collection Time: 04/13/21  9:50 PM    Specimen: Blood   Result Value Ref Range    D-Dimer, Quantitative 1.92 (H) 0.00 - 0.56 MCGFEU/mL   BNP    Collection Time: 04/13/21  9:50 PM    Specimen: Blood   Result Value Ref Range    proBNP 89.4 0.0 - 450.0 pg/mL   CBC  Auto Differential    Collection Time: 04/13/21  9:50 PM    Specimen: Blood   Result Value Ref Range    WBC 9.12 3.40 - 10.80 10*3/mm3    RBC 3.86 3.77 - 5.28 10*6/mm3    Hemoglobin 11.1 (L) 12.0 - 15.9 g/dL    Hematocrit 35.4 34.0 - 46.6 %    MCV 91.7 79.0 - 97.0 fL    MCH 28.8 26.6 - 33.0 pg    MCHC 31.4 (L) 31.5 - 35.7 g/dL    RDW 13.5 12.3 - 15.4 %    RDW-SD 45.8 37.0 - 54.0 fl    MPV 10.5 6.0 - 12.0 fL    Platelets 293 140 - 450 10*3/mm3    Neutrophil % 60.0 42.7 - 76.0 %    Lymphocyte % 30.7 19.6 - 45.3 %    Monocyte % 7.3 5.0 - 12.0 %    Eosinophil % 0.9 0.3 - 6.2 %    Basophil % 0.9 0.0 - 1.5 %    Immature Grans % 0.2 0.0 - 0.5 %    Neutrophils, Absolute 5.47 1.70 - 7.00 10*3/mm3    Lymphocytes, Absolute 2.80 0.70 - 3.10 10*3/mm3    Monocytes, Absolute 0.67 0.10 - 0.90 10*3/mm3    Eosinophils, Absolute 0.08 0.00 - 0.40 10*3/mm3    Basophils, Absolute 0.08 0.00 - 0.20 10*3/mm3    Immature Grans, Absolute 0.02 0.00 - 0.05 10*3/mm3    nRBC 0.0 0.0 - 0.2 /100 WBC   Magnesium    Collection Time: 04/13/21  9:50 PM    Specimen: Blood   Result Value Ref Range    Magnesium 1.9 1.6 - 2.6 mg/dL   Urinalysis With Microscopic If Indicated (No Culture) - Urine, Clean Catch    Collection Time: 04/13/21 10:25 PM    Specimen: Urine, Clean Catch   Result Value Ref Range    Color, UA Yellow Yellow, Straw    Appearance, UA Clear Clear    pH, UA 6.5 5.0 - 8.0    Specific Gravity, UA 1.017 1.001 - 1.030    Glucose, UA Negative Negative    Ketones, UA Negative Negative    Bilirubin, UA Negative Negative    Blood, UA Negative Negative    Protein, UA Negative Negative    Leuk Esterase, UA Negative Negative    Nitrite, UA Negative Negative    Urobilinogen, UA 0.2 E.U./dL 0.2 - 1.0 E.U./dL   POCT Pregnancy, Urine    Collection Time: 04/13/21 10:25 PM    Specimen: Urine   Result Value Ref Range    HCG, Urine, QL Negative Negative    Lot Number [id number]     Internal Positive Control Positive     Internal Negative Control Negative   "    Note: In addition to lab results from this visit, the labs listed above may include labs taken at another facility or during a different encounter within the last 24 hours. Please correlate lab times with ED admission and discharge times for further clarification of the services performed during this visit.    CT Angiogram Chest   Final Result   Negative chest CT examination using pulmonary embolism protocol.      Signer Name: Erik Beard MD    Signed: 4/13/2021 11:35 PM    Workstation Name: [facility]-     [facility]        Vitals:    04/13/21 2124 04/13/21 2200   BP: 123/83 109/70   BP Location: Left arm    Patient Position: Sitting    Pulse: 87 87   Resp: 16    Temp: 97.8 °F (36.6 °C)    TempSrc: Tympanic    SpO2: 94% 95%   Weight: 79.4 kg (175 lb)    Height: 167.6 cm (66\")      Medications   iopamidol (ISOVUE-370) 76 % injection 100 mL (58 mL Intravenous Given 4/13/21 2314)     ECG/EMG Results (last 24 hours)     Procedure Component Value Units Date/Time    ECG 12 Lead [[id number]] Collected: 04/13/21 2140     Updated: 04/13/21 2141        ECG 12 Lead                       MDM    Final diagnoses:   Chest pain, unspecified type   Palpitations       ED Disposition  ED Disposition     ED Disposition Condition Comment    Discharge Stable           [facility] CARDIOLOG[facility]  [address]  [address]  [facility] [id number] [phone]  In 3 days           Medication List      Changed    predniSONE 10 MG tablet  Commonly known as: DELTASONE  Take 4 po qd x 3 d, then 3 po qd x 3d, then  2 po qd x 3d, then 1 po qd  What changed:   · how much to take  · when to take this             Jason Barrera MD  04/14/21 2597    "

## 2022-01-04 ENCOUNTER — HOSPITAL ENCOUNTER (EMERGENCY)
Facility: HOSPITAL | Age: 32
Discharge: HOME | End: 2022-01-04
Attending: EMERGENCY MEDICINE | Admitting: EMERGENCY MEDICINE

## 2022-01-04 ENCOUNTER — APPOINTMENT (OUTPATIENT)
Dept: GENERAL RADIOLOGY | Facility: HOSPITAL | Age: 32
End: 2022-01-04

## 2022-01-04 VITALS
BODY MASS INDEX: 26.52 KG/M2 | HEART RATE: 85 BPM | TEMPERATURE: 98.6 F | DIASTOLIC BLOOD PRESSURE: 78 MMHG | SYSTOLIC BLOOD PRESSURE: 127 MMHG | HEIGHT: 66 IN | OXYGEN SATURATION: 95 % | RESPIRATION RATE: 18 BRPM | WEIGHT: 165 LBS
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
## 2022-01-04 DIAGNOSIS — T14.90XA INHALATION INJURY: ICD-10-CM

## 2022-01-04 PROCEDURE — 99282 EMERGENCY DEPT VISIT SF MDM: CPT

## 2022-01-04 PROCEDURE — 71045 X-RAY EXAM CHEST 1 VIEW: CPT

## 2022-01-04 RX ORDER — PREGABALIN 100 MG/1
100 CAPSULE ORAL 2 TIMES DAILY
COMMUNITY
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
## 2022-07-31 ENCOUNTER — APPOINTMENT (OUTPATIENT)
Dept: GENERAL RADIOLOGY | Facility: HOSPITAL | Age: 32
End: 2022-07-31

## 2022-07-31 ENCOUNTER — HOSPITAL ENCOUNTER (EMERGENCY)
Facility: HOSPITAL | Age: 32
Discharge: HOME | End: 2022-07-31
Attending: EMERGENCY MEDICINE | Admitting: EMERGENCY MEDICINE

## 2022-07-31 VITALS
TEMPERATURE: 103.2 F | BODY MASS INDEX: 27.32 KG/M2 | HEIGHT: 66 IN | DIASTOLIC BLOOD PRESSURE: 56 MMHG | WEIGHT: 170 LBS | OXYGEN SATURATION: 96 % | RESPIRATION RATE: 22 BRPM | HEART RATE: 114 BPM | SYSTOLIC BLOOD PRESSURE: 113 MMHG

## 2022-07-31 DIAGNOSIS — R53.83 MALAISE AND FATIGUE: ICD-10-CM

## 2022-07-31 DIAGNOSIS — U07.1 COVID-19 VIRUS INFECTION: Primary | ICD-10-CM

## 2022-07-31 DIAGNOSIS — Z92.29 COVID-19 VACCINE SERIES COMPLETED: ICD-10-CM

## 2022-07-31 DIAGNOSIS — R53.81 MALAISE AND FATIGUE: ICD-10-CM

## 2022-07-31 DIAGNOSIS — R51.9 GENERALIZED HEADACHE: ICD-10-CM

## 2022-07-31 DIAGNOSIS — Z87.39 HISTORY OF FIBROMYALGIA: ICD-10-CM

## 2022-07-31 DIAGNOSIS — Z87.39 HISTORY OF RHEUMATOID ARTHRITIS: ICD-10-CM

## 2022-07-31 DIAGNOSIS — R52 GENERALIZED BODY ACHES: ICD-10-CM

## 2022-07-31 DIAGNOSIS — R50.9 FEVER AND CHILLS: ICD-10-CM

## 2022-07-31 LAB
ALBUMIN SERPL-MCNC: 3.5 G/DL (ref 3.5–5.2)
ALBUMIN/GLOB SERPL: 0.9 G/DL
ALP SERPL-CCNC: 76 U/L (ref 39–117)
ALT SERPL W P-5'-P-CCNC: 7 U/L (ref 1–33)
ANION GAP SERPL CALCULATED.3IONS-SCNC: 11 MMOL/L (ref 5–15)
AST SERPL-CCNC: 14 U/L (ref 1–32)
BASOPHILS # BLD AUTO: 0.04 10*3/MM3 (ref 0–0.2)
BASOPHILS NFR BLD AUTO: 0.5 % (ref 0–1.5)
BILIRUB SERPL-MCNC: 0.3 MG/DL (ref 0–1.2)
BUN SERPL-MCNC: 7 MG/DL (ref 6–20)
BUN/CREAT SERPL: 9.5 (ref 7–25)
CALCIUM SPEC-SCNC: 9.2 MG/DL (ref 8.6–10.5)
CHLORIDE SERPL-SCNC: 102 MMOL/L (ref 98–107)
CO2 SERPL-SCNC: 26 MMOL/L (ref 22–29)
CREAT SERPL-MCNC: 0.74 MG/DL (ref 0.57–1)
DEPRECATED RDW RBC AUTO: 40.8 FL (ref 37–54)
EGFRCR SERPLBLD CKD-EPI 2021: 111.1 ML/MIN/1.73
EOSINOPHIL # BLD AUTO: 0.05 10*3/MM3 (ref 0–0.4)
EOSINOPHIL NFR BLD AUTO: 0.6 % (ref 0.3–6.2)
ERYTHROCYTE [DISTWIDTH] IN BLOOD BY AUTOMATED COUNT: 13.2 % (ref 12.3–15.4)
FLUAV RNA RESP QL NAA+PROBE: NOT DETECTED
FLUBV RNA RESP QL NAA+PROBE: NOT DETECTED
GLOBULIN UR ELPH-MCNC: 4 GM/DL
GLUCOSE SERPL-MCNC: 99 MG/DL (ref 65–99)
HCT VFR BLD AUTO: 34.2 % (ref 34–46.6)
HGB BLD-MCNC: 11 G/DL (ref 12–15.9)
IMM GRANULOCYTES # BLD AUTO: 0.02 10*3/MM3 (ref 0–0.05)
IMM GRANULOCYTES NFR BLD AUTO: 0.3 % (ref 0–0.5)
LYMPHOCYTES # BLD AUTO: 1.3 10*3/MM3 (ref 0.7–3.1)
LYMPHOCYTES NFR BLD AUTO: 16.8 % (ref 19.6–45.3)
MCH RBC QN AUTO: 26.8 PG (ref 26.6–33)
MCHC RBC AUTO-ENTMCNC: 32.2 G/DL (ref 31.5–35.7)
MCV RBC AUTO: 83.4 FL (ref 79–97)
MONOCYTES # BLD AUTO: 0.62 10*3/MM3 (ref 0.1–0.9)
MONOCYTES NFR BLD AUTO: 8 % (ref 5–12)
NEUTROPHILS NFR BLD AUTO: 5.71 10*3/MM3 (ref 1.7–7)
NEUTROPHILS NFR BLD AUTO: 73.8 % (ref 42.7–76)
NRBC BLD AUTO-RTO: 0 /100 WBC (ref 0–0.2)
PLATELET # BLD AUTO: 365 10*3/MM3 (ref 140–450)
PMV BLD AUTO: 9.6 FL (ref 6–12)
POTASSIUM SERPL-SCNC: 3.7 MMOL/L (ref 3.5–5.2)
PROT SERPL-MCNC: 7.5 G/DL (ref 6–8.5)
RBC # BLD AUTO: 4.1 10*6/MM3 (ref 3.77–5.28)
S PYO AG THROAT QL: NEGATIVE
SARS-COV-2 RNA RESP QL NAA+PROBE: DETECTED
SODIUM SERPL-SCNC: 139 MMOL/L (ref 136–145)
WBC NRBC COR # BLD: 7.74 10*3/MM3 (ref 3.4–10.8)

## 2022-07-31 PROCEDURE — 99284 EMERGENCY DEPT VISIT MOD MDM: CPT

## 2022-07-31 PROCEDURE — 85025 COMPLETE CBC W/AUTO DIFF WBC: CPT | Performed by: PHYSICIAN ASSISTANT

## 2022-07-31 PROCEDURE — 93005 ELECTROCARDIOGRAM TRACING: CPT | Performed by: PHYSICIAN ASSISTANT

## 2022-07-31 PROCEDURE — 80053 COMPREHEN METABOLIC PANEL: CPT | Performed by: PHYSICIAN ASSISTANT

## 2022-07-31 PROCEDURE — 71045 X-RAY EXAM CHEST 1 VIEW: CPT

## 2022-07-31 PROCEDURE — 87880 STREP A ASSAY W/OPTIC: CPT | Performed by: EMERGENCY MEDICINE

## 2022-07-31 PROCEDURE — 87636 SARSCOV2 & INF A&B AMP PRB: CPT | Performed by: PHYSICIAN ASSISTANT

## 2022-07-31 PROCEDURE — 87081 CULTURE SCREEN ONLY: CPT | Performed by: EMERGENCY MEDICINE

## 2022-07-31 RX ORDER — ACETAMINOPHEN 500 MG
1000 TABLET ORAL ONCE
Status: COMPLETED | OUTPATIENT
Start: 2022-07-31 | End: 2022-07-31

## 2022-07-31 RX ADMIN — SODIUM CHLORIDE 1000 ML: 9 INJECTION, SOLUTION INTRAVENOUS at 02:54

## 2022-07-31 RX ADMIN — ACETAMINOPHEN 1000 MG: 500 TABLET, FILM COATED ORAL at 01:53

## 2022-08-02 LAB — BACTERIA SPEC AEROBE CULT: NORMAL

## 2022-08-06 ENCOUNTER — APPOINTMENT (OUTPATIENT)
Dept: GENERAL RADIOLOGY | Facility: HOSPITAL | Age: 32
End: 2022-08-06

## 2022-08-06 ENCOUNTER — HOSPITAL ENCOUNTER (EMERGENCY)
Facility: HOSPITAL | Age: 32
Discharge: LEFT BEFORE DISCHARGE | End: 2022-08-07

## 2022-08-06 VITALS
WEIGHT: 160 LBS | DIASTOLIC BLOOD PRESSURE: 83 MMHG | BODY MASS INDEX: 25.71 KG/M2 | TEMPERATURE: 98.1 F | HEIGHT: 66 IN | SYSTOLIC BLOOD PRESSURE: 108 MMHG | OXYGEN SATURATION: 97 % | HEART RATE: 82 BPM | RESPIRATION RATE: 16 BRPM

## 2022-08-06 LAB
ALBUMIN SERPL-MCNC: 3.6 G/DL (ref 3.5–5.2)
ALBUMIN/GLOB SERPL: 0.8 G/DL
ALP SERPL-CCNC: 69 U/L (ref 39–117)
ALT SERPL W P-5'-P-CCNC: 7 U/L (ref 1–33)
ANION GAP SERPL CALCULATED.3IONS-SCNC: 9 MMOL/L (ref 5–15)
AST SERPL-CCNC: 18 U/L (ref 1–32)
BASOPHILS # BLD AUTO: 0.03 10*3/MM3 (ref 0–0.2)
BASOPHILS NFR BLD AUTO: 0.4 % (ref 0–1.5)
BILIRUB SERPL-MCNC: 0.2 MG/DL (ref 0–1.2)
BUN SERPL-MCNC: 8 MG/DL (ref 6–20)
BUN/CREAT SERPL: 12.7 (ref 7–25)
CALCIUM SPEC-SCNC: 9.7 MG/DL (ref 8.6–10.5)
CHLORIDE SERPL-SCNC: 105 MMOL/L (ref 98–107)
CO2 SERPL-SCNC: 27 MMOL/L (ref 22–29)
CREAT SERPL-MCNC: 0.63 MG/DL (ref 0.57–1)
DEPRECATED RDW RBC AUTO: 40 FL (ref 37–54)
EGFRCR SERPLBLD CKD-EPI 2021: 121.8 ML/MIN/1.73
EOSINOPHIL # BLD AUTO: 0.05 10*3/MM3 (ref 0–0.4)
EOSINOPHIL NFR BLD AUTO: 0.7 % (ref 0.3–6.2)
ERYTHROCYTE [DISTWIDTH] IN BLOOD BY AUTOMATED COUNT: 13.3 % (ref 12.3–15.4)
GLOBULIN UR ELPH-MCNC: 4.5 GM/DL
GLUCOSE SERPL-MCNC: 83 MG/DL (ref 65–99)
HCT VFR BLD AUTO: 35.3 % (ref 34–46.6)
HGB BLD-MCNC: 11.6 G/DL (ref 12–15.9)
HOLD SPECIMEN: NORMAL
IMM GRANULOCYTES # BLD AUTO: 0.01 10*3/MM3 (ref 0–0.05)
IMM GRANULOCYTES NFR BLD AUTO: 0.1 % (ref 0–0.5)
LYMPHOCYTES # BLD AUTO: 2.39 10*3/MM3 (ref 0.7–3.1)
LYMPHOCYTES NFR BLD AUTO: 31.3 % (ref 19.6–45.3)
MCH RBC QN AUTO: 26.9 PG (ref 26.6–33)
MCHC RBC AUTO-ENTMCNC: 32.9 G/DL (ref 31.5–35.7)
MCV RBC AUTO: 81.9 FL (ref 79–97)
MONOCYTES # BLD AUTO: 0.46 10*3/MM3 (ref 0.1–0.9)
MONOCYTES NFR BLD AUTO: 6 % (ref 5–12)
NEUTROPHILS NFR BLD AUTO: 4.7 10*3/MM3 (ref 1.7–7)
NEUTROPHILS NFR BLD AUTO: 61.5 % (ref 42.7–76)
NRBC BLD AUTO-RTO: 0 /100 WBC (ref 0–0.2)
NT-PROBNP SERPL-MCNC: 43.8 PG/ML (ref 0–450)
PLATELET # BLD AUTO: 373 10*3/MM3 (ref 140–450)
PMV BLD AUTO: 10 FL (ref 6–12)
POTASSIUM SERPL-SCNC: 4.1 MMOL/L (ref 3.5–5.2)
PROT SERPL-MCNC: 8.1 G/DL (ref 6–8.5)
RBC # BLD AUTO: 4.31 10*6/MM3 (ref 3.77–5.28)
SODIUM SERPL-SCNC: 141 MMOL/L (ref 136–145)
TROPONIN T SERPL-MCNC: <0.01 NG/ML (ref 0–0.03)
WBC NRBC COR # BLD: 7.64 10*3/MM3 (ref 3.4–10.8)
WHOLE BLOOD HOLD COAG: NORMAL
WHOLE BLOOD HOLD SPECIMEN: NORMAL

## 2022-08-06 PROCEDURE — 93005 ELECTROCARDIOGRAM TRACING: CPT

## 2022-08-06 PROCEDURE — 84484 ASSAY OF TROPONIN QUANT: CPT

## 2022-08-06 PROCEDURE — 99211 OFF/OP EST MAY X REQ PHY/QHP: CPT

## 2022-08-06 PROCEDURE — 85025 COMPLETE CBC W/AUTO DIFF WBC: CPT

## 2022-08-06 PROCEDURE — 71045 X-RAY EXAM CHEST 1 VIEW: CPT

## 2022-08-06 PROCEDURE — 83880 ASSAY OF NATRIURETIC PEPTIDE: CPT

## 2022-08-06 PROCEDURE — 80053 COMPREHEN METABOLIC PANEL: CPT

## 2022-08-06 RX ORDER — SODIUM CHLORIDE 0.9 % (FLUSH) 0.9 %
10 SYRINGE (ML) INJECTION AS NEEDED
Status: DISCONTINUED | OUTPATIENT
Start: 2022-08-06 | End: 2022-08-07 | Stop reason: HOSPADM

## 2022-08-08 LAB
QT INTERVAL: 318 MS
QT INTERVAL: 354 MS
QTC INTERVAL: 415 MS
QTC INTERVAL: 439 MS

## 2023-06-13 ENCOUNTER — HOSPITAL ENCOUNTER (EMERGENCY)
Facility: HOSPITAL | Age: 33
Discharge: LEFT BEFORE DISCHARGE | End: 2023-06-13
Payer: COMMERCIAL

## 2023-06-13 VITALS
TEMPERATURE: 98.8 F | BODY MASS INDEX: 28.12 KG/M2 | SYSTOLIC BLOOD PRESSURE: 135 MMHG | DIASTOLIC BLOOD PRESSURE: 80 MMHG | HEART RATE: 97 BPM | RESPIRATION RATE: 16 BRPM | WEIGHT: 175 LBS | HEIGHT: 66 IN | OXYGEN SATURATION: 99 %

## 2023-06-13 LAB
ALBUMIN SERPL-MCNC: 3.8 G/DL (ref 3.5–5.2)
ALBUMIN/GLOB SERPL: 1 G/DL
ALP SERPL-CCNC: 53 U/L (ref 39–117)
ALT SERPL W P-5'-P-CCNC: 12 U/L (ref 1–33)
ANION GAP SERPL CALCULATED.3IONS-SCNC: 13 MMOL/L (ref 5–15)
AST SERPL-CCNC: 18 U/L (ref 1–32)
BASOPHILS # BLD AUTO: 0.06 10*3/MM3 (ref 0–0.2)
BASOPHILS NFR BLD AUTO: 0.8 % (ref 0–1.5)
BILIRUB SERPL-MCNC: 0.2 MG/DL (ref 0–1.2)
BILIRUB UR QL STRIP: NEGATIVE
BUN SERPL-MCNC: 8 MG/DL (ref 6–20)
BUN/CREAT SERPL: 11.4 (ref 7–25)
CALCIUM SPEC-SCNC: 9 MG/DL (ref 8.6–10.5)
CHLORIDE SERPL-SCNC: 103 MMOL/L (ref 98–107)
CLARITY UR: CLEAR
CO2 SERPL-SCNC: 23 MMOL/L (ref 22–29)
COLOR UR: YELLOW
CREAT SERPL-MCNC: 0.7 MG/DL (ref 0.57–1)
DEPRECATED RDW RBC AUTO: 40.2 FL (ref 37–54)
EGFRCR SERPLBLD CKD-EPI 2021: 118.7 ML/MIN/1.73
EOSINOPHIL # BLD AUTO: 0.09 10*3/MM3 (ref 0–0.4)
EOSINOPHIL NFR BLD AUTO: 1.3 % (ref 0.3–6.2)
ERYTHROCYTE [DISTWIDTH] IN BLOOD BY AUTOMATED COUNT: 12.6 % (ref 12.3–15.4)
GLOBULIN UR ELPH-MCNC: 3.7 GM/DL
GLUCOSE SERPL-MCNC: 101 MG/DL (ref 65–99)
GLUCOSE UR STRIP-MCNC: NEGATIVE MG/DL
HCT VFR BLD AUTO: 37.7 % (ref 34–46.6)
HGB BLD-MCNC: 11.7 G/DL (ref 12–15.9)
HGB UR QL STRIP.AUTO: NEGATIVE
HOLD SPECIMEN: NORMAL
IMM GRANULOCYTES # BLD AUTO: 0.02 10*3/MM3 (ref 0–0.05)
IMM GRANULOCYTES NFR BLD AUTO: 0.3 % (ref 0–0.5)
KETONES UR QL STRIP: NEGATIVE
LEUKOCYTE ESTERASE UR QL STRIP.AUTO: NEGATIVE
LYMPHOCYTES # BLD AUTO: 3.13 10*3/MM3 (ref 0.7–3.1)
LYMPHOCYTES NFR BLD AUTO: 44 % (ref 19.6–45.3)
MCH RBC QN AUTO: 27 PG (ref 26.6–33)
MCHC RBC AUTO-ENTMCNC: 31 G/DL (ref 31.5–35.7)
MCV RBC AUTO: 87.1 FL (ref 79–97)
MONOCYTES # BLD AUTO: 0.55 10*3/MM3 (ref 0.1–0.9)
MONOCYTES NFR BLD AUTO: 7.7 % (ref 5–12)
NEUTROPHILS NFR BLD AUTO: 3.27 10*3/MM3 (ref 1.7–7)
NEUTROPHILS NFR BLD AUTO: 45.9 % (ref 42.7–76)
NITRITE UR QL STRIP: NEGATIVE
NRBC BLD AUTO-RTO: 0 /100 WBC (ref 0–0.2)
PH UR STRIP.AUTO: 6 [PH] (ref 5–8)
PLATELET # BLD AUTO: 311 10*3/MM3 (ref 140–450)
PMV BLD AUTO: 10 FL (ref 6–12)
POTASSIUM SERPL-SCNC: 3.6 MMOL/L (ref 3.5–5.2)
PROT SERPL-MCNC: 7.5 G/DL (ref 6–8.5)
PROT UR QL STRIP: NEGATIVE
QT INTERVAL: 344 MS
QTC INTERVAL: 434 MS
RBC # BLD AUTO: 4.33 10*6/MM3 (ref 3.77–5.28)
SODIUM SERPL-SCNC: 139 MMOL/L (ref 136–145)
SP GR UR STRIP: 1.01 (ref 1–1.03)
UROBILINOGEN UR QL STRIP: NORMAL
WBC NRBC COR # BLD: 7.12 10*3/MM3 (ref 3.4–10.8)
WHOLE BLOOD HOLD COAG: NORMAL
WHOLE BLOOD HOLD SPECIMEN: NORMAL

## 2023-06-13 PROCEDURE — 93005 ELECTROCARDIOGRAM TRACING: CPT

## 2023-06-13 PROCEDURE — 99211 OFF/OP EST MAY X REQ PHY/QHP: CPT

## 2023-06-13 PROCEDURE — 81003 URINALYSIS AUTO W/O SCOPE: CPT

## 2023-06-13 PROCEDURE — 80053 COMPREHEN METABOLIC PANEL: CPT

## 2023-06-13 PROCEDURE — 85025 COMPLETE CBC W/AUTO DIFF WBC: CPT

## 2023-06-13 RX ORDER — SODIUM CHLORIDE 0.9 % (FLUSH) 0.9 %
10 SYRINGE (ML) INJECTION AS NEEDED
Status: DISCONTINUED | OUTPATIENT
Start: 2023-06-13 | End: 2023-06-13 | Stop reason: HOSPADM

## 2024-09-03 ENCOUNTER — TELEPHONE (OUTPATIENT)
Age: 34
End: 2024-09-03
Payer: COMMERCIAL

## 2024-09-03 NOTE — TELEPHONE ENCOUNTER
Pt called stating that she is going to see an new rheumatologist and needs all of her records faxed over to them. Fax number is [phone]. Put attention to Dr. Longoria.

## 2024-09-03 NOTE — TELEPHONE ENCOUNTER
Patient will need to complete a release of information prior to obtain all her medical records.     I spoke with the patient and explained the process, I have also updated her address as the one we had was not accurate. I have instructed patient how to send the form back and medical records request are completed within 30 days of receipt.